# Patient Record
Sex: FEMALE | Race: WHITE | NOT HISPANIC OR LATINO | Employment: FULL TIME | URBAN - NONMETROPOLITAN AREA
[De-identification: names, ages, dates, MRNs, and addresses within clinical notes are randomized per-mention and may not be internally consistent; named-entity substitution may affect disease eponyms.]

---

## 2017-04-20 ENCOUNTER — OFFICE VISIT (OUTPATIENT)
Dept: OBSTETRICS AND GYNECOLOGY | Facility: CLINIC | Age: 27
End: 2017-04-20

## 2017-04-20 VITALS
BODY MASS INDEX: 48.82 KG/M2 | WEIGHT: 293 LBS | DIASTOLIC BLOOD PRESSURE: 90 MMHG | SYSTOLIC BLOOD PRESSURE: 130 MMHG | HEIGHT: 65 IN

## 2017-04-20 DIAGNOSIS — N92.1 MENORRHAGIA WITH IRREGULAR CYCLE: Primary | ICD-10-CM

## 2017-04-20 DIAGNOSIS — R10.2 PELVIC PAIN: ICD-10-CM

## 2017-04-20 PROCEDURE — 99202 OFFICE O/P NEW SF 15 MIN: CPT | Performed by: NURSE PRACTITIONER

## 2017-04-20 RX ORDER — MEDROXYPROGESTERONE ACETATE 10 MG/1
10 TABLET ORAL DAILY
Qty: 30 TABLET | Refills: 1 | Status: SHIPPED | OUTPATIENT
Start: 2017-04-20 | End: 2019-10-03

## 2017-04-20 RX ORDER — IRON POLYSACCHARIDE COMPLEX 150 MG
150 CAPSULE ORAL 2 TIMES DAILY
COMMUNITY

## 2017-04-20 RX ORDER — LEVOTHYROXINE SODIUM 0.1 MG/1
0.23 TABLET ORAL DAILY
COMMUNITY
End: 2018-09-28

## 2017-04-20 RX ORDER — ESCITALOPRAM OXALATE 10 MG/1
10 TABLET ORAL DAILY
COMMUNITY

## 2017-04-20 NOTE — PROGRESS NOTES
"Subjective   History of Present Illness    Shawnee Benjamin is a 26 y.o. female who presents for C/O having a period for 5 weeks. Received first depo provera injection in 2017. Flow rate fluctuates from saturating one tampon and one pad in less than 15 minutes to 3-4 pads a day. C/O of right pelvic pain x 5 weeks that is rated with pain of a 4 out of 10 constant and reaching a 7 out of 10 at night. Heating pads and ibuprofen improve pain symptoms. Had pelvic U/S performed this month at outside facility, WNL. Thyroid levels also WNL. CBC shows iron deficiency anemia, currently taking niferex for that. H/o menorrhagia even with use of COCs. PMH PCOS.       Current contraception: Depo-Provera injections  Sexually active?: Yes  Postmenopausal?: No  HRT?: no  Hysterectomy?: no    Date last pap:  per patient  History of abnormal Pap smear: no    /90  Ht 65\" (165.1 cm)  Wt (!) 346 lb (157 kg)  LMP Comment: Patient states she has been on her period for 5 weeks  BMI 57.58 kg/m2    No past medical history on file.    No past surgical history on file.    The following portions of the patient's history were reviewed and updated as appropriate: allergies, current medications, past family history, past medical history, past social history, past surgical history and problem list.    Review of Systems    Constitutional:  No fatigue, no weight loss, no weight gain, no fever, no chills   Respiratory: No dyspnea, no cough, no hemoptysis, no wheezing, no pleuritic pain   Cardiovascular: No chest pain, no palpitations, no arrhythmia, no orthopnea, no nocturnal dyspnea, no edema, no claudication   Breasts: No discharge from nipple, No breast tenderness and No breast mass   Gastrointestinal: No loss of appetite, No dysphagia, No abdominal pain, No nausea, No vomiting, No change in bowel habits, No diarrhea, No constipation and No blood in stool   Genitourinary: No increased frequency of urination, No " dysuria, No hematuria, No nocturia, No urinary incontinence, No vaginal discharge, No abnormal vaginal bleeding, No menopausal problem, Pelvic pain and Menstrual problem   Skin: No skin rash, No skin lesion, No dry skin, No pruritus and No nail problem   Neurologic: No headache, No dizziness, No lightheadedness, No syncope, No vertigo, No weakness, No numbness, No tremor and No paresthesia   Psychiatric: No difficulty sleeping, No mood swings, No feeling anxious, No confusion and No memory loss          Objective   Physical Exam    General:  Alert and oriented x 3, Cooperative, Well developed & well nourished and No acute distress       Assessment/Plan   Shawnee was seen today for polycystic ovary syndrome.    Diagnoses and all orders for this visit:    Menorrhagia with irregular cycle    Pelvic pain    Other orders  -     medroxyPROGESTERone (PROVERA) 10 MG tablet; Take 1 tablet by mouth Daily.      All questions answered.  Discussed contraception methods, including risks/side effects/benefits.  Discussed pelvic pain. Went over GYN causes such as PID, ovarian torsion, ruptured ovarian cysts, ectopic pregnancy, endometriosis, adenomyosis, and uterine fibroids. Advised patient to use NSAIDs and heating pads for symptomatic treatment.  Discussed menorrhagia/menometrorrhagia. Discussed structural causes such as endometrial/cervical polyps, adenomyosis, leiomyoma, malignancy/hyperplasia. Additionally nonstructural causes such as coagulopathy, ovulatory dysfuction, iatrogenic. Discussed work up with CBC/TSH, pelvic U/S, EMB if necessary. Discussed management with contraception vs. D&C vs. endometrial ablation vs. hysterectomy.    Follow up as needed.   Advised pt to continue using depo provera, will send rx for 30 day course of provera for AUB. Patient to report back if heavy bleeding continues or worsens. She has no further issues at this time.

## 2017-05-01 ENCOUNTER — TELEPHONE (OUTPATIENT)
Dept: OBSTETRICS AND GYNECOLOGY | Facility: CLINIC | Age: 27
End: 2017-05-01

## 2017-11-22 ENCOUNTER — TELEPHONE (OUTPATIENT)
Dept: OBSTETRICS AND GYNECOLOGY | Facility: CLINIC | Age: 27
End: 2017-11-22

## 2017-11-22 NOTE — TELEPHONE ENCOUNTER
----- Message from Jami Mendoza LPN sent at 11/21/2017  2:26 PM CST -----  Contact: [phone]      ----- Message -----     From: Nilsa López     Sent: 11/21/2017   2:14 PM       To: Jami Mendoza LPN    Pt of Mitna's, her mother Jojo(who is on release) called has some questions about bleeding, she saw Mitna for and the medicine she was placed on. Please call back. Thanks!

## 2017-11-22 NOTE — TELEPHONE ENCOUNTER
Started another heavy period with clots a few days ago and is concerned about it. Just had her second Depo injection on 11/1 with family practice (per mother). Advised to present to ER for evaluation of heavy bleeding only if saturating a pad/tampon every hour more than once or passing clots larger than palm sized. Explained that irregular bleeding patterns are expected with Depo and may not improve during the first 3-4 injections (9-12 months). Pt's mother confirmed an understanding and will have pt call back if she has any other concerns.

## 2018-06-14 ENCOUNTER — TRANSCRIBE ORDERS (OUTPATIENT)
Dept: ADMINISTRATIVE | Facility: HOSPITAL | Age: 28
End: 2018-06-14

## 2018-06-14 DIAGNOSIS — E03.9 HYPOTHYROIDISM, UNSPECIFIED TYPE: Primary | ICD-10-CM

## 2018-06-18 ENCOUNTER — HOSPITAL ENCOUNTER (OUTPATIENT)
Dept: MRI IMAGING | Facility: HOSPITAL | Age: 28
Discharge: HOME | End: 2018-06-18

## 2018-06-18 DIAGNOSIS — E03.9 HYPOTHYROIDISM, UNSPECIFIED TYPE: ICD-10-CM

## 2018-06-25 ENCOUNTER — HOSPITAL ENCOUNTER (OUTPATIENT)
Dept: MRI IMAGING | Facility: HOSPITAL | Age: 28
Discharge: HOME | End: 2018-06-25
Admitting: NURSE PRACTITIONER

## 2018-06-25 DIAGNOSIS — E03.9 HYPOTHYROIDISM, UNSPECIFIED TYPE: ICD-10-CM

## 2018-06-25 LAB — CREAT BLDA-MCNC: 0.9 MG/DL (ref 0.6–1.3)

## 2018-06-25 PROCEDURE — 82565 ASSAY OF CREATININE: CPT

## 2018-06-25 PROCEDURE — 70553 MRI BRAIN STEM W/O & W/DYE: CPT

## 2018-06-25 PROCEDURE — 0 GADOBENATE DIMEGLUMINE 529 MG/ML SOLUTION: Performed by: NURSE PRACTITIONER

## 2018-06-25 PROCEDURE — A9577 INJ MULTIHANCE: HCPCS | Performed by: NURSE PRACTITIONER

## 2018-06-25 RX ADMIN — GADOBENATE DIMEGLUMINE 20 ML: 529 INJECTION, SOLUTION INTRAVENOUS at 14:45

## 2018-09-28 ENCOUNTER — OFFICE VISIT (OUTPATIENT)
Dept: ENDOCRINOLOGY | Facility: CLINIC | Age: 28
End: 2018-09-28

## 2018-09-28 ENCOUNTER — APPOINTMENT (OUTPATIENT)
Dept: LAB | Facility: HOSPITAL | Age: 28
End: 2018-09-28

## 2018-09-28 VITALS
OXYGEN SATURATION: 95 % | HEART RATE: 83 BPM | HEIGHT: 65 IN | DIASTOLIC BLOOD PRESSURE: 74 MMHG | SYSTOLIC BLOOD PRESSURE: 126 MMHG | BODY MASS INDEX: 48.82 KG/M2 | WEIGHT: 293 LBS

## 2018-09-28 DIAGNOSIS — L83 ACANTHOSIS NIGRICANS: ICD-10-CM

## 2018-09-28 DIAGNOSIS — E03.8 HYPOTHYROIDISM DUE TO HASHIMOTO'S THYROIDITIS: Primary | ICD-10-CM

## 2018-09-28 DIAGNOSIS — R73.03 PREDIABETES: ICD-10-CM

## 2018-09-28 DIAGNOSIS — E06.3 HYPOTHYROIDISM DUE TO HASHIMOTO'S THYROIDITIS: Primary | ICD-10-CM

## 2018-09-28 DIAGNOSIS — L68.0 HIRSUTISM: ICD-10-CM

## 2018-09-28 LAB
ALBUMIN SERPL-MCNC: 4.2 G/DL (ref 3.4–4.8)
ALBUMIN/GLOB SERPL: 0.9 G/DL (ref 1.1–1.8)
ALP SERPL-CCNC: 92 U/L (ref 38–126)
ALT SERPL W P-5'-P-CCNC: 41 U/L (ref 9–52)
ANION GAP SERPL CALCULATED.3IONS-SCNC: 12 MMOL/L (ref 5–15)
AST SERPL-CCNC: 79 U/L (ref 14–36)
BASOPHILS # BLD AUTO: 0.03 10*3/MM3 (ref 0–0.2)
BASOPHILS NFR BLD AUTO: 0.3 % (ref 0–2)
BILIRUB SERPL-MCNC: 0.6 MG/DL (ref 0.2–1.3)
BUN BLD-MCNC: 12 MG/DL (ref 7–21)
BUN/CREAT SERPL: 14.3 (ref 7–25)
CALCIUM SPEC-SCNC: 8.3 MG/DL (ref 8.4–10.2)
CHLORIDE SERPL-SCNC: 99 MMOL/L (ref 95–110)
CO2 SERPL-SCNC: 26 MMOL/L (ref 22–31)
CORTIS PM SERPL-MCNC: 6.36 MCG/DL (ref 1.7–14.1)
CREAT BLD-MCNC: 0.84 MG/DL (ref 0.5–1)
DEPRECATED RDW RBC AUTO: 56.8 FL (ref 36.4–46.3)
EOSINOPHIL # BLD AUTO: 0.25 10*3/MM3 (ref 0–0.7)
EOSINOPHIL NFR BLD AUTO: 2.3 % (ref 0–7)
ERYTHROCYTE [DISTWIDTH] IN BLOOD BY AUTOMATED COUNT: 19.3 % (ref 11.5–14.5)
FSH SERPL-ACNC: 4 MIU/ML
GFR SERPL CREATININE-BSD FRML MDRD: 80 ML/MIN/1.73 (ref 71–165)
GLOBULIN UR ELPH-MCNC: 4.5 GM/DL (ref 2.3–3.5)
GLUCOSE BLD-MCNC: 76 MG/DL (ref 60–100)
HBA1C MFR BLD: 5.8 % (ref 4–5.6)
HCT VFR BLD AUTO: 35 % (ref 35–45)
HGB BLD-MCNC: 11.3 G/DL (ref 12–15.5)
HYPOCHROMIA BLD QL: NORMAL
IMM GRANULOCYTES # BLD: 0.07 10*3/MM3 (ref 0–0.02)
IMM GRANULOCYTES NFR BLD: 0.6 % (ref 0–0.5)
LH SERPL-ACNC: 4.16 MIU/ML
LYMPHOCYTES # BLD AUTO: 2.64 10*3/MM3 (ref 0.6–4.2)
LYMPHOCYTES NFR BLD AUTO: 24.2 % (ref 10–50)
MCH RBC QN AUTO: 26.2 PG (ref 26.5–34)
MCHC RBC AUTO-ENTMCNC: 32.3 G/DL (ref 31.4–36)
MCV RBC AUTO: 81.2 FL (ref 80–98)
MONOCYTES # BLD AUTO: 0.52 10*3/MM3 (ref 0–0.9)
MONOCYTES NFR BLD AUTO: 4.8 % (ref 0–12)
NEUTROPHILS # BLD AUTO: 7.39 10*3/MM3 (ref 2–8.6)
NEUTROPHILS NFR BLD AUTO: 67.8 % (ref 37–80)
PLAT MORPH BLD: NORMAL
PLATELET # BLD AUTO: 154 10*3/MM3 (ref 150–450)
PMV BLD AUTO: 10.4 FL (ref 8–12)
POTASSIUM BLD-SCNC: 3.5 MMOL/L (ref 3.5–5.1)
PROT SERPL-MCNC: 8.7 G/DL (ref 6.3–8.6)
RBC # BLD AUTO: 4.31 10*6/MM3 (ref 3.77–5.16)
SODIUM BLD-SCNC: 137 MMOL/L (ref 137–145)
T4 FREE SERPL-MCNC: 0.42 NG/DL (ref 0.78–2.19)
TSH SERPL DL<=0.05 MIU/L-ACNC: >100 MIU/ML (ref 0.46–4.68)
WBC MORPH BLD: NORMAL
WBC NRBC COR # BLD: 10.9 10*3/MM3 (ref 3.2–9.8)

## 2018-09-28 PROCEDURE — 83002 ASSAY OF GONADOTROPIN (LH): CPT | Performed by: INTERNAL MEDICINE

## 2018-09-28 PROCEDURE — 83516 IMMUNOASSAY NONANTIBODY: CPT | Performed by: INTERNAL MEDICINE

## 2018-09-28 PROCEDURE — 84681 ASSAY OF C-PEPTIDE: CPT | Performed by: INTERNAL MEDICINE

## 2018-09-28 PROCEDURE — 99204 OFFICE O/P NEW MOD 45 MIN: CPT | Performed by: INTERNAL MEDICINE

## 2018-09-28 PROCEDURE — 82533 TOTAL CORTISOL: CPT | Performed by: INTERNAL MEDICINE

## 2018-09-28 PROCEDURE — 84403 ASSAY OF TOTAL TESTOSTERONE: CPT | Performed by: INTERNAL MEDICINE

## 2018-09-28 PROCEDURE — 84481 FREE ASSAY (FT-3): CPT | Performed by: INTERNAL MEDICINE

## 2018-09-28 PROCEDURE — 82784 ASSAY IGA/IGD/IGG/IGM EACH: CPT | Performed by: INTERNAL MEDICINE

## 2018-09-28 PROCEDURE — 36415 COLL VENOUS BLD VENIPUNCTURE: CPT | Performed by: INTERNAL MEDICINE

## 2018-09-28 PROCEDURE — 82670 ASSAY OF TOTAL ESTRADIOL: CPT | Performed by: INTERNAL MEDICINE

## 2018-09-28 PROCEDURE — 83036 HEMOGLOBIN GLYCOSYLATED A1C: CPT | Performed by: INTERNAL MEDICINE

## 2018-09-28 PROCEDURE — 82627 DEHYDROEPIANDROSTERONE: CPT | Performed by: INTERNAL MEDICINE

## 2018-09-28 PROCEDURE — 83001 ASSAY OF GONADOTROPIN (FSH): CPT | Performed by: INTERNAL MEDICINE

## 2018-09-28 PROCEDURE — 80050 GENERAL HEALTH PANEL: CPT | Performed by: INTERNAL MEDICINE

## 2018-09-28 PROCEDURE — 82024 ASSAY OF ACTH: CPT | Performed by: INTERNAL MEDICINE

## 2018-09-28 PROCEDURE — 84439 ASSAY OF FREE THYROXINE: CPT | Performed by: INTERNAL MEDICINE

## 2018-09-28 PROCEDURE — 83498 ASY HYDROXYPROGESTERONE 17-D: CPT | Performed by: INTERNAL MEDICINE

## 2018-09-28 PROCEDURE — 85007 BL SMEAR W/DIFF WBC COUNT: CPT | Performed by: INTERNAL MEDICINE

## 2018-09-28 RX ORDER — LIOTHYRONINE SODIUM 50 UG/1
50 TABLET ORAL DAILY
Qty: 30 TABLET | Refills: 11
Start: 2018-09-28 | End: 2018-10-01

## 2018-09-28 RX ORDER — LIOTHYRONINE SODIUM 25 UG/1
25 TABLET ORAL DAILY
Qty: 30 TABLET | Refills: 11
Start: 2018-09-28 | End: 2018-10-01

## 2018-09-29 LAB
C PEPTIDE SERPL-MCNC: 5.6 NG/ML (ref 1.1–4.4)
DHEA-S SERPL-MCNC: 17.2 UG/DL (ref 84.8–378)
ESTRADIOL SERPL HS-MCNC: 39.8 PG/ML
GLIADIN PEPTIDE IGA SER-ACNC: 7 UNITS (ref 0–19)
IGA SERPL-MCNC: 486 MG/DL (ref 87–352)
T3FREE SERPL-MCNC: 1.5 PG/ML (ref 2–4.4)
TTG IGA SER-ACNC: <2 U/ML (ref 0–3)

## 2018-09-30 PROBLEM — L83 ACANTHOSIS NIGRICANS: Status: ACTIVE | Noted: 2018-09-30

## 2018-09-30 PROBLEM — L68.0 HIRSUTISM: Status: ACTIVE | Noted: 2018-09-30

## 2018-10-01 LAB — ACTH PLAS-MCNC: 9 PG/ML (ref 7.2–63.3)

## 2018-10-01 RX ORDER — LEVOTHYROXINE SODIUM 300 UG/1
TABLET ORAL
Qty: 45 TABLET | Refills: 11 | Status: SHIPPED | OUTPATIENT
Start: 2018-10-01 | End: 2018-11-09 | Stop reason: SDUPTHER

## 2018-10-02 LAB
17OHP SERPL-MCNC: 29 NG/DL
TESTOST SERPL-MCNC: 36.3 NG/DL (ref 10–55)

## 2018-11-09 ENCOUNTER — OFFICE VISIT (OUTPATIENT)
Dept: ENDOCRINOLOGY | Facility: CLINIC | Age: 28
End: 2018-11-09

## 2018-11-09 VITALS
WEIGHT: 293 LBS | HEART RATE: 90 BPM | BODY MASS INDEX: 48.82 KG/M2 | HEIGHT: 65 IN | DIASTOLIC BLOOD PRESSURE: 64 MMHG | SYSTOLIC BLOOD PRESSURE: 138 MMHG

## 2018-11-09 DIAGNOSIS — L83 ACANTHOSIS NIGRICANS: ICD-10-CM

## 2018-11-09 DIAGNOSIS — L68.0 HIRSUTISM: ICD-10-CM

## 2018-11-09 DIAGNOSIS — E55.9 VITAMIN D DEFICIENCY: ICD-10-CM

## 2018-11-09 DIAGNOSIS — E06.3 HYPOTHYROIDISM DUE TO HASHIMOTO'S THYROIDITIS: Primary | ICD-10-CM

## 2018-11-09 DIAGNOSIS — E03.8 HYPOTHYROIDISM DUE TO HASHIMOTO'S THYROIDITIS: Primary | ICD-10-CM

## 2018-11-09 PROCEDURE — 99214 OFFICE O/P EST MOD 30 MIN: CPT | Performed by: NURSE PRACTITIONER

## 2018-11-09 RX ORDER — LEVOTHYROXINE SODIUM 175 MCG
175 TABLET ORAL DAILY
Qty: 30 TABLET | Refills: 5 | Status: SHIPPED | OUTPATIENT
Start: 2018-11-09 | End: 2019-03-28 | Stop reason: SDUPTHER

## 2018-11-09 RX ORDER — LEVOTHYROXINE SODIUM 300 UG/1
TABLET ORAL
Qty: 30 TABLET | Refills: 5 | Status: SHIPPED | OUTPATIENT
Start: 2018-11-09 | End: 2018-12-19 | Stop reason: DRUGHIGH

## 2018-11-09 NOTE — PROGRESS NOTES
Subjective    Shawnee Benjamin is a 28 y.o. female. she is here today for follow-up.    History of Present Illness         Referring provider  Primary Care Provider     Angie Spivey APRN      Hypothyroidism      Duration - 10 years     Timing - Hypothyroidism is Constant     Quality - Uncontrolled     Severity -   Moderate     Complications -  None     Current symptoms/problems - Fatigue and weight gain      Alleviating Factors - adequate dosing by Ms. Spivey     Aggravating Factors - suspected noncompliance although patient denies. Patient states that she takes her thyroid replacement without missing doses.                 Evaluation history:  TSH   Date Value Ref Range Status   09/28/2018 >100.000 (H) 0.460 - 4.680 mIU/mL Final     Free T4   Date Value Ref Range Status   09/28/2018 0.42 (L) 0.78 - 2.19 ng/dL Final     T3, Free   Date Value Ref Range Status   09/28/2018 1.5 (L) 2.0 - 4.4 pg/mL Final       Current medications:  Current Outpatient Prescriptions   Medication Sig Dispense Refill   • escitalopram (LEXAPRO) 10 MG tablet Take 10 mg by mouth Daily.     • iron polysaccharides (NIFEREX) 150 MG capsule Take 150 mg by mouth 2 (Two) Times a Day.     • levothyroxine (SYNTHROID, LEVOTHROID) 300 MCG tablet Take 1.5 tabs daily, Brand name synthroid 45 tablet 11   • medroxyPROGESTERone (PROVERA) 10 MG tablet Take 1 tablet by mouth Daily. 30 tablet 1   • metFORMIN (GLUCOPHAGE) 500 MG tablet Take 500 mg by mouth 2 (Two) Times a Day With Meals.       No current facility-administered medications for this visit.        The following portions of the patient's history were reviewed and updated as appropriate:   Past Medical History:   Diagnosis Date   • Acanthosis nigricans 9/30/2018   • Hirsutism 9/30/2018   • Hypothyroidism due to Hashimoto's thyroiditis 9/28/2018     History reviewed. No pertinent surgical history.  Family History   Problem Relation Age of Onset   • Diabetes Mother      OB History   "   No data available        Allergies   Allergen Reactions   • Erythromycin Rash   • Septra [Sulfamethoxazole-Trimethoprim] Rash     Social History     Social History   • Marital status: Single     Social History Main Topics   • Smoking status: Never Smoker   • Smokeless tobacco: Never Used   • Alcohol use No   • Drug use: No   • Sexual activity: Defer     Other Topics Concern   • Not on file       Review of Systems  Review of Systems   Constitutional: Negative for activity change, appetite change, diaphoresis and fatigue.   HENT: Negative for facial swelling, sneezing, sore throat, tinnitus, trouble swallowing and voice change.    Eyes: Negative for photophobia, pain, discharge, redness, itching and visual disturbance.   Respiratory: Negative for apnea, cough, choking, chest tightness and shortness of breath.    Cardiovascular: Negative for chest pain, palpitations and leg swelling.   Gastrointestinal: Negative for abdominal distention, abdominal pain, constipation, diarrhea, nausea and vomiting.   Endocrine: Negative for cold intolerance, heat intolerance, polydipsia, polyphagia and polyuria.   Genitourinary: Negative for difficulty urinating, dysuria, frequency, hematuria and urgency.   Musculoskeletal: Negative for arthralgias, back pain, gait problem, joint swelling, myalgias, neck pain and neck stiffness.   Skin: Negative for color change, pallor, rash and wound.   Neurological: Negative for dizziness, tremors, weakness, light-headedness, numbness and headaches.   Hematological: Negative for adenopathy. Does not bruise/bleed easily.   Psychiatric/Behavioral: Negative for behavioral problems, confusion and sleep disturbance.        Objective    /64 (BP Location: Left arm, Patient Position: Sitting, Cuff Size: Adult)   Pulse 90   Ht 165.1 cm (65\")   Wt (!) 153 kg (338 lb)   BMI 56.25 kg/m²   Physical Exam   Constitutional: She is oriented to person, place, and time. She appears well-developed and " well-nourished. No distress.   HENT:   Head: Normocephalic and atraumatic.   Right Ear: External ear normal.   Left Ear: External ear normal.   Nose: Nose normal.   Eyes: Pupils are equal, round, and reactive to light. Conjunctivae and EOM are normal.   Neck: Normal range of motion. Neck supple. No tracheal deviation present. No thyromegaly present.   Cardiovascular: Normal rate, regular rhythm and normal heart sounds.    No murmur heard.  Pulmonary/Chest: Effort normal and breath sounds normal. No respiratory distress. She has no wheezes.   Abdominal: Soft. Bowel sounds are normal. There is no tenderness. There is no rebound and no guarding.   Musculoskeletal: Normal range of motion. She exhibits no edema, tenderness or deformity.   Neurological: She is alert and oriented to person, place, and time. No cranial nerve deficit.   Skin: Skin is warm and dry. No rash noted.   Psychiatric: She has a normal mood and affect. Her behavior is normal. Judgment and thought content normal.       Lab Review  Lab Results   Component Value Date    TSH >100.000 (H) 09/28/2018     Lab Results   Component Value Date    FREET4 0.42 (L) 09/28/2018        Assessment/Plan      1. Hypothyroidism due to Hashimoto's thyroiditis    2. Vitamin D deficiency    3. Hirsutism    4. Acanthosis nigricans    . This diagnosis was discussed and reviewed with the patient including the advantages of drug therapy.     1. Orders placed during this encounter include:  Orders Placed This Encounter   Procedures   • TSH     Standing Status:   Future     Standing Expiration Date:   11/9/2019   • T4, Free     Standing Status:   Future     Standing Expiration Date:   11/9/2019   • Comprehensive Metabolic Panel     Standing Status:   Future     Standing Expiration Date:   11/9/2019   • Vitamin D 25 Hydroxy     Standing Status:   Future     Standing Expiration Date:   11/9/2019   • Vitamin B12     Standing Status:   Future     Standing Expiration Date:   11/9/2019    • CBC & Differential     Standing Status:   Future     Standing Expiration Date:   11/9/2019     Order Specific Question:   Manual Differential     Answer:   No       Medications prescribed:  Outpatient Encounter Prescriptions as of 11/9/2018   Medication Sig Dispense Refill   • escitalopram (LEXAPRO) 10 MG tablet Take 10 mg by mouth Daily.     • iron polysaccharides (NIFEREX) 150 MG capsule Take 150 mg by mouth 2 (Two) Times a Day.     • levothyroxine (SYNTHROID, LEVOTHROID) 300 MCG tablet Take 1.5 tabs daily, Brand name synthroid 45 tablet 11   • medroxyPROGESTERone (PROVERA) 10 MG tablet Take 1 tablet by mouth Daily. 30 tablet 1   • metFORMIN (GLUCOPHAGE) 500 MG tablet Take 500 mg by mouth 2 (Two) Times a Day With Meals.       No facility-administered encounter medications on file as of 11/9/2018.      Patient has hypothyroidism     With levothyroxine 300 mcgs daily her TSH was 100     With cytomel 75 mg daily her TSH Is greater than 100     I believe noncompliance      Nevertheless rule out celiac     Rule out consumptive hypothyroidism     Plan     Stop cytomel  Restart brand name synthroid , I will start with 450 mcgs daily      Monthly labs.       November 2018    TSH - 6.89        Increase to 475 mcg one daily   ---     She has acanthosis, prediabetes on metformin      Provera?  I need to verify . She states only intermittent use.      I need to rule out Cushing's      CAH ruled out     Reevaluate menstrual cycle . It should be at least every 3 months.      Component      Latest Ref Rng & Units 9/28/2018   ACTH      7.2 - 63.3 pg/mL 9.0   Cortisol - PM      1.70 - 14.10 mcg/dL 6.36   Testosterone, Total      10.0 - 55.0 ng/dL 36.3   Estradiol      pg/mL 39.8   17-Hydroxyprogesterone      ng/dL 29   DHEA-Sulfate      84.8 - 378.0 ug/dL 17.2 (L)   Hemoglobin A1C      4 - 5.6 % 5.8 (H)   C-Peptide      1.1 - 4.4 ng/mL 5.6 (H)         Vitamin d def    Taking vitamin d daily       4. Return in about 6 weeks  (around 12/21/2018) for Recheck.

## 2018-11-15 DIAGNOSIS — E03.8 HYPOTHYROIDISM DUE TO HASHIMOTO'S THYROIDITIS: ICD-10-CM

## 2018-11-15 DIAGNOSIS — E06.3 HYPOTHYROIDISM DUE TO HASHIMOTO'S THYROIDITIS: ICD-10-CM

## 2018-12-19 ENCOUNTER — OFFICE VISIT (OUTPATIENT)
Dept: ENDOCRINOLOGY | Facility: CLINIC | Age: 28
End: 2018-12-19

## 2018-12-19 VITALS
HEIGHT: 65 IN | DIASTOLIC BLOOD PRESSURE: 74 MMHG | SYSTOLIC BLOOD PRESSURE: 126 MMHG | WEIGHT: 293 LBS | HEART RATE: 102 BPM | BODY MASS INDEX: 48.82 KG/M2

## 2018-12-19 DIAGNOSIS — E55.9 VITAMIN D DEFICIENCY: ICD-10-CM

## 2018-12-19 DIAGNOSIS — E06.3 HYPOTHYROIDISM DUE TO HASHIMOTO'S THYROIDITIS: Primary | ICD-10-CM

## 2018-12-19 DIAGNOSIS — L68.0 HIRSUTISM: ICD-10-CM

## 2018-12-19 DIAGNOSIS — E03.8 HYPOTHYROIDISM DUE TO HASHIMOTO'S THYROIDITIS: Primary | ICD-10-CM

## 2018-12-19 PROCEDURE — 99214 OFFICE O/P EST MOD 30 MIN: CPT | Performed by: NURSE PRACTITIONER

## 2018-12-19 RX ORDER — LEVOTHYROXINE SODIUM 300 UG/1
300 TABLET ORAL DAILY
COMMUNITY
End: 2019-03-28 | Stop reason: SDUPTHER

## 2018-12-19 NOTE — PROGRESS NOTES
Subjective    Shawnee Benjamin is a 28 y.o. female. she is here today for follow-up.    History of Present Illness       Referring provider  Primary Care Provider     Angie Spivey APRN      Hypothyroidism      Duration - 10 years     Timing - Hypothyroidism is Constant     Quality - Uncontrolled     Severity -   Moderate     Complications -  None     Current symptoms/problems - Fatigue and weight gain      Alleviating Factors - adequate dosing by Ms. Spivey     Aggravating Factors - suspected noncompliance although patient denies. Patient states that she takes her thyroid replacement without missing doses.     Quality     November 2018       TSH - 6. 89        Evaluation history:  TSH   Date Value Ref Range Status   09/28/2018 >100.000 (H) 0.460 - 4.680 mIU/mL Final     Free T4   Date Value Ref Range Status   09/28/2018 0.42 (L) 0.78 - 2.19 ng/dL Final     T3, Free   Date Value Ref Range Status   09/28/2018 1.5 (L) 2.0 - 4.4 pg/mL Final       Current medications:  Current Outpatient Medications   Medication Sig Dispense Refill   • escitalopram (LEXAPRO) 10 MG tablet Take 10 mg by mouth Daily.     • iron polysaccharides (NIFEREX) 150 MG capsule Take 150 mg by mouth 2 (Two) Times a Day.     • levothyroxine (SYNTHROID) 300 MCG tablet Take 300 mcg by mouth Daily.     • medroxyPROGESTERone (PROVERA) 10 MG tablet Take 1 tablet by mouth Daily. 30 tablet 1   • metFORMIN (GLUCOPHAGE) 500 MG tablet Take 500 mg by mouth 2 (Two) Times a Day With Meals.     • SYNTHROID 175 MCG tablet Take 1 tablet by mouth Daily. 30 tablet 5     No current facility-administered medications for this visit.        The following portions of the patient's history were reviewed and updated as appropriate:   Past Medical History:   Diagnosis Date   • Acanthosis nigricans 9/30/2018   • Hirsutism 9/30/2018   • Hypothyroidism due to Hashimoto's thyroiditis 9/28/2018     No past surgical history on file.  Family History   Problem  "Relation Age of Onset   • Diabetes Mother      OB History     No data available        Allergies   Allergen Reactions   • Erythromycin Rash   • Septra [Sulfamethoxazole-Trimethoprim] Rash     Social History     Socioeconomic History   • Marital status: Single     Spouse name: Not on file   • Number of children: Not on file   • Years of education: Not on file   • Highest education level: Not on file   Tobacco Use   • Smoking status: Never Smoker   • Smokeless tobacco: Never Used   Substance and Sexual Activity   • Alcohol use: No   • Drug use: No   • Sexual activity: Defer       Review of Systems  Review of Systems   Constitutional: Negative for activity change, appetite change, diaphoresis and fatigue.   HENT: Negative for facial swelling, sneezing, sore throat, tinnitus, trouble swallowing and voice change.    Eyes: Negative for photophobia, pain, discharge, redness, itching and visual disturbance.   Respiratory: Negative for apnea, cough, choking, chest tightness and shortness of breath.    Cardiovascular: Negative for chest pain, palpitations and leg swelling.   Gastrointestinal: Negative for abdominal distention, abdominal pain, constipation, diarrhea, nausea and vomiting.   Endocrine: Negative for cold intolerance, heat intolerance, polydipsia, polyphagia and polyuria.   Genitourinary: Negative for difficulty urinating, dysuria, frequency, hematuria and urgency.   Musculoskeletal: Negative for arthralgias, back pain, gait problem, joint swelling, myalgias, neck pain and neck stiffness.   Skin: Negative for color change, pallor, rash and wound.   Neurological: Negative for dizziness, tremors, weakness, light-headedness, numbness and headaches.   Hematological: Negative for adenopathy. Does not bruise/bleed easily.   Psychiatric/Behavioral: Negative for behavioral problems, confusion and sleep disturbance.        Objective    /74   Pulse 102   Ht 165.1 cm (65\")   Wt (!) 150 kg (331 lb 1 oz)   LMP " 11/07/2018 (Within Days)   Breastfeeding? No   BMI 55.09 kg/m²   Physical Exam   Constitutional: She is oriented to person, place, and time. She appears well-developed and well-nourished. No distress.   HENT:   Head: Normocephalic and atraumatic.   Right Ear: External ear normal.   Left Ear: External ear normal.   Nose: Nose normal.   Eyes: Conjunctivae and EOM are normal. Pupils are equal, round, and reactive to light.   Neck: Normal range of motion. Neck supple. No tracheal deviation present. No thyromegaly present.   Cardiovascular: Normal rate, regular rhythm and normal heart sounds.   No murmur heard.  Pulmonary/Chest: Effort normal and breath sounds normal. No respiratory distress. She has no wheezes.   Abdominal: Soft. Bowel sounds are normal. There is no tenderness. There is no rebound and no guarding.   Musculoskeletal: Normal range of motion. She exhibits no edema, tenderness or deformity.   Neurological: She is alert and oriented to person, place, and time. No cranial nerve deficit.   Skin: Skin is warm and dry. No rash noted.   Psychiatric: She has a normal mood and affect. Her behavior is normal. Judgment and thought content normal.       Lab Review  Lab Results   Component Value Date    TSH >100.000 (H) 09/28/2018     Lab Results   Component Value Date    FREET4 0.42 (L) 09/28/2018        Assessment/Plan      1. Hypothyroidism due to Hashimoto's thyroiditis    2. Vitamin D deficiency    3. Hirsutism    . This diagnosis was discussed and reviewed with the patient including the advantages of drug therapy.     1. Orders placed during this encounter include:  Orders Placed This Encounter   Procedures   • TSH   • Comprehensive Metabolic Panel   • Comprehensive Metabolic Panel     Standing Status:   Future     Standing Expiration Date:   12/19/2019   • Vitamin D 25 Hydroxy     Standing Status:   Future     Standing Expiration Date:   12/19/2019   • TSH     Standing Status:   Future     Standing Expiration  Date:   12/19/2019   • CBC & Differential     Order Specific Question:   Manual Differential     Answer:   No   • CBC & Differential     Standing Status:   Future     Standing Expiration Date:   12/19/2019     Order Specific Question:   Manual Differential     Answer:   No       Medications prescribed:  Outpatient Encounter Medications as of 12/19/2018   Medication Sig Dispense Refill   • escitalopram (LEXAPRO) 10 MG tablet Take 10 mg by mouth Daily.     • iron polysaccharides (NIFEREX) 150 MG capsule Take 150 mg by mouth 2 (Two) Times a Day.     • levothyroxine (SYNTHROID) 300 MCG tablet Take 300 mcg by mouth Daily.     • medroxyPROGESTERone (PROVERA) 10 MG tablet Take 1 tablet by mouth Daily. 30 tablet 1   • metFORMIN (GLUCOPHAGE) 500 MG tablet Take 500 mg by mouth 2 (Two) Times a Day With Meals.     • SYNTHROID 175 MCG tablet Take 1 tablet by mouth Daily. 30 tablet 5   • [DISCONTINUED] levothyroxine (SYNTHROID, LEVOTHROID) 300 MCG tablet Take 1 tabs daily-brand only 30 tablet 5     No facility-administered encounter medications on file as of 12/19/2018.      Patient has hypothyroidism     With levothyroxine 300 mcgs daily her TSH was 100     With cytomel 75 mg daily her TSH Is greater than 100     I believe noncompliance      Nevertheless rule out celiac     Rule out consumptive hypothyroidism     Plan     Stop cytomel  Restart brand name synthroid , I will start with 450 mcgs daily      Monthly labs.         November 2018     TSH - 6.89         Increase to 475 mcg one daily   ---     She has acanthosis, prediabetes on metformin      Provera?  I need to verify . She states only intermittent use.      I need to rule out Cushing's      CAH ruled out     Reevaluate menstrual cycle . It should be at least every 3 months.      Component      Latest Ref Rng & Units 9/28/2018   ACTH      7.2 - 63.3 pg/mL 9.0   Cortisol - PM      1.70 - 14.10 mcg/dL 6.36   Testosterone, Total      10.0 - 55.0 ng/dL 36.3   Estradiol       pg/mL 39.8   17-Hydroxyprogesterone      ng/dL 29   DHEA-Sulfate      84.8 - 378.0 ug/dL 17.2 (L)   Hemoglobin A1C      4 - 5.6 % 5.8 (H)   C-Peptide      1.1 - 4.4 ng/mL 5.6 (H)         Vitamin d def     Taking vitamin d daily     Need new labs today             4. Return in about 3 months (around 3/19/2019) for Recheck.

## 2019-03-28 RX ORDER — LEVOTHYROXINE SODIUM 300 MCG
TABLET ORAL
Qty: 30 TABLET | Refills: 0 | Status: SHIPPED | OUTPATIENT
Start: 2019-03-28 | End: 2019-05-25 | Stop reason: SDUPTHER

## 2019-03-28 RX ORDER — LEVOTHYROXINE SODIUM 175 MCG
TABLET ORAL
Qty: 30 TABLET | Refills: 0 | Status: SHIPPED | OUTPATIENT
Start: 2019-03-28 | End: 2019-05-25 | Stop reason: SDUPTHER

## 2019-05-28 RX ORDER — LEVOTHYROXINE SODIUM 300 MCG
TABLET ORAL
Qty: 30 TABLET | Refills: 0 | Status: SHIPPED | OUTPATIENT
Start: 2019-05-28 | End: 2019-06-24 | Stop reason: SDUPTHER

## 2019-05-28 RX ORDER — LEVOTHYROXINE SODIUM 175 MCG
TABLET ORAL
Qty: 30 TABLET | Refills: 0 | Status: SHIPPED | OUTPATIENT
Start: 2019-05-28 | End: 2019-06-24 | Stop reason: SDUPTHER

## 2019-06-22 RX ORDER — LEVOTHYROXINE SODIUM 175 MCG
TABLET ORAL
Qty: 30 TABLET | Refills: 0 | Status: CANCELLED | OUTPATIENT
Start: 2019-06-22

## 2019-06-22 RX ORDER — LEVOTHYROXINE SODIUM 300 MCG
TABLET ORAL
Qty: 30 TABLET | Refills: 0 | Status: CANCELLED | OUTPATIENT
Start: 2019-06-22

## 2019-06-24 RX ORDER — LEVOTHYROXINE SODIUM 300 MCG
300 TABLET ORAL DAILY
Qty: 30 TABLET | Refills: 0 | Status: SHIPPED | OUTPATIENT
Start: 2019-06-24 | End: 2019-07-23 | Stop reason: SDUPTHER

## 2019-06-24 RX ORDER — LEVOTHYROXINE SODIUM 175 MCG
175 TABLET ORAL DAILY
Qty: 30 TABLET | Refills: 0 | Status: SHIPPED | OUTPATIENT
Start: 2019-06-24 | End: 2019-07-23 | Stop reason: SDUPTHER

## 2019-07-23 RX ORDER — LEVOTHYROXINE SODIUM 300 MCG
TABLET ORAL
Qty: 30 TABLET | Refills: 0 | Status: SHIPPED | OUTPATIENT
Start: 2019-07-23

## 2019-07-23 RX ORDER — LEVOTHYROXINE SODIUM 175 MCG
TABLET ORAL
Qty: 30 TABLET | Refills: 0 | Status: SHIPPED | OUTPATIENT
Start: 2019-07-23

## 2019-08-20 RX ORDER — LEVOTHYROXINE SODIUM 300 MCG
TABLET ORAL
Qty: 30 TABLET | Refills: 0 | OUTPATIENT
Start: 2019-08-20

## 2019-08-20 RX ORDER — LEVOTHYROXINE SODIUM 175 MCG
TABLET ORAL
Qty: 30 TABLET | Refills: 0 | OUTPATIENT
Start: 2019-08-20

## 2019-08-29 RX ORDER — LEVOTHYROXINE SODIUM 300 MCG
TABLET ORAL
Qty: 30 TABLET | Refills: 0 | OUTPATIENT
Start: 2019-08-29

## 2019-08-29 RX ORDER — LEVOTHYROXINE SODIUM 175 MCG
TABLET ORAL
Qty: 30 TABLET | Refills: 0 | OUTPATIENT
Start: 2019-08-29

## 2019-09-23 NOTE — PROGRESS NOTES
PHYSICAL EXAM:  CONSTITUTIONAL: Alert, appropriate, no acute distress, obese  EYES: Non icteric, EOM intact, pupils equal round   ENT: Mucus membranes moist, no oral pharyngeal lesions, external inspection of ears and nose are normal  NECK: Supple, no masses. No palpable thyroid mass  CHEST/LUNGS: CTA bilaterally, normal respiratory effort   CARDIOVASCULAR: RRR, no murmurs. No lower extremity edema  ABDOMEN: soft non-tender, active bowel sounds, no HSM. No palpable masses  EXTREMITIES: warm, full ROM in all 4 extremities, no focal weakness. SKIN: warm, dry with no rashes or lesions  LYMPH: No cervical, clavicular, axillary, or inguinal lymphadenopathy  NEUROLOGIC: follows commands, non focal   PSYCH: mood and affect appropriate. Alert and oriented to time, place, person        LABORATORY RESULTS REVIEWED BY ME:  WBC 10.7, hemoglobin 13.0, platelet count 417,371  8/26/2019-ferritin 18.0 iron saturation 13%, TIBC 347, iron 45. Normal LFTs, creatinine 0.79, glucose 197,     RADIOLOGY STUDIES REVIEWED BY ME:  Normal MRI of the pituitary gland June 2018    ASSESSMENT:  #Iron deficient state-CBC today showed hemoglobin 13/MCV 86 and platelet counts 031,344. RDW 18.8. The obvious reason for her iron deficiency is persistent menorrhagia. I am not sure if her uncontrolled hypothyroidism may be playing a role on her menorrhagia. From my standpoint I would like to rule out von Willebrand disease. Essentially she has iron deficient state. Her hemoglobin has slightly increased since her last CBC. She has started iron pills a few weeks ago. She was requested to switch to iron sulfate 325 mg p.o. 3 times daily. The patient was counseled today about diagnosis, diagnostic tests, medications, side effects and disease management. The method of counseling included verbal explanation. The patient verbalized understanding.   #Hypothyroidism-continue Synthroid as per endocrinology  #Menorrhagia- follow-up with GYN    PLAN:  Start iron sulfate 325 mg p.o. 3 times daily  Follow-up with GYN  Follow-up in 6 weeks. We will check von Willebrand levels before next visit. Jud Kenyon, Dr Kimmy Ko, personally performed the services described in this documentation as scribed by Landry Meza MA in my presence and is both accurate and complete. Over 50% of the total visit time of 60 minutes in face to face encounter with the patient, out of which more than 50% of the time was spent in counseling patient or family and coordination of care. Counseling included but was not limited to time spent reviewing labs, imaging studies/ treatment plan and answering questions.      09/23/19  9:20 AM

## 2019-09-24 ENCOUNTER — OFFICE VISIT (OUTPATIENT)
Dept: HEMATOLOGY | Age: 29
End: 2019-09-24
Payer: MEDICAID

## 2019-09-24 VITALS
DIASTOLIC BLOOD PRESSURE: 84 MMHG | SYSTOLIC BLOOD PRESSURE: 152 MMHG | HEART RATE: 90 BPM | WEIGHT: 293 LBS | OXYGEN SATURATION: 97 %

## 2019-09-24 DIAGNOSIS — E55.9 VITAMIN D DEFICIENCY: ICD-10-CM

## 2019-09-24 DIAGNOSIS — E03.9 HYPOTHYROIDISM, UNSPECIFIED TYPE: ICD-10-CM

## 2019-09-24 DIAGNOSIS — I10 HYPERTENSION, UNSPECIFIED TYPE: ICD-10-CM

## 2019-09-24 DIAGNOSIS — D64.9 ANEMIA, UNSPECIFIED TYPE: ICD-10-CM

## 2019-09-24 PROCEDURE — 99205 OFFICE O/P NEW HI 60 MIN: CPT | Performed by: INTERNAL MEDICINE

## 2019-09-24 PROCEDURE — G8421 BMI NOT CALCULATED: HCPCS | Performed by: INTERNAL MEDICINE

## 2019-09-24 PROCEDURE — G8428 CUR MEDS NOT DOCUMENT: HCPCS | Performed by: INTERNAL MEDICINE

## 2019-09-24 PROCEDURE — 1036F TOBACCO NON-USER: CPT | Performed by: INTERNAL MEDICINE

## 2019-09-24 RX ORDER — HYDROCHLOROTHIAZIDE 25 MG/1
TABLET ORAL
COMMUNITY
Start: 2019-08-29

## 2019-09-24 RX ORDER — LEVOTHYROXINE SODIUM 175 MCG
200 TABLET ORAL
COMMUNITY
Start: 2019-09-16

## 2019-09-24 RX ORDER — POTASSIUM CHLORIDE 20 MEQ/1
TABLET, EXTENDED RELEASE ORAL
COMMUNITY
Start: 2019-08-29

## 2019-09-24 RX ORDER — LEVOTHYROXINE SODIUM 300 MCG
TABLET ORAL
COMMUNITY
Start: 2019-09-16

## 2019-09-24 RX ORDER — IRON POLYSACCHARIDE COMPLEX 150 MG
150 CAPSULE ORAL
COMMUNITY

## 2019-09-24 RX ORDER — ERGOCALCIFEROL 1.25 MG/1
CAPSULE ORAL
COMMUNITY
Start: 2019-08-29

## 2019-09-24 SDOH — HEALTH STABILITY: MENTAL HEALTH: HOW OFTEN DO YOU HAVE A DRINK CONTAINING ALCOHOL?: NEVER

## 2019-10-03 ENCOUNTER — OFFICE VISIT (OUTPATIENT)
Dept: OBSTETRICS AND GYNECOLOGY | Facility: CLINIC | Age: 29
End: 2019-10-03

## 2019-10-03 ENCOUNTER — APPOINTMENT (OUTPATIENT)
Dept: LAB | Facility: HOSPITAL | Age: 29
End: 2019-10-03

## 2019-10-03 VITALS
DIASTOLIC BLOOD PRESSURE: 80 MMHG | SYSTOLIC BLOOD PRESSURE: 138 MMHG | WEIGHT: 293 LBS | HEIGHT: 67 IN | BODY MASS INDEX: 45.99 KG/M2

## 2019-10-03 DIAGNOSIS — Z30.09 UNWANTED FERTILITY: ICD-10-CM

## 2019-10-03 DIAGNOSIS — R73.09 ELEVATED HEMOGLOBIN A1C: ICD-10-CM

## 2019-10-03 DIAGNOSIS — Z30.011 ENCOUNTER FOR INITIAL PRESCRIPTION OF CONTRACEPTIVE PILLS: ICD-10-CM

## 2019-10-03 DIAGNOSIS — E03.8 HYPOTHYROIDISM DUE TO HASHIMOTO'S THYROIDITIS: ICD-10-CM

## 2019-10-03 DIAGNOSIS — E06.3 HYPOTHYROIDISM DUE TO HASHIMOTO'S THYROIDITIS: ICD-10-CM

## 2019-10-03 DIAGNOSIS — E28.2 PCOS (POLYCYSTIC OVARIAN SYNDROME): ICD-10-CM

## 2019-10-03 DIAGNOSIS — N92.1 MENORRHAGIA WITH IRREGULAR CYCLE: Primary | ICD-10-CM

## 2019-10-03 LAB
DEPRECATED RDW RBC AUTO: 54.5 FL (ref 37–54)
ERYTHROCYTE [DISTWIDTH] IN BLOOD BY AUTOMATED COUNT: 17.6 % (ref 12.3–15.4)
ESTRADIOL SERPL HS-MCNC: 18.4 PG/ML
FSH SERPL-ACNC: 3.23 MIU/ML
HBA1C MFR BLD: 7.1 % (ref 4.8–5.6)
HCT VFR BLD AUTO: 38.3 % (ref 34–46.6)
HGB BLD-MCNC: 12.6 G/DL (ref 12–15.9)
IRON 24H UR-MRATE: 50 MCG/DL (ref 37–145)
IRON SATN MFR SERPL: 12 % (ref 20–50)
LH SERPL-ACNC: 2.76 MIU/ML
MCH RBC QN AUTO: 28 PG (ref 26.6–33)
MCHC RBC AUTO-ENTMCNC: 32.9 G/DL (ref 31.5–35.7)
MCV RBC AUTO: 85.1 FL (ref 79–97)
PLATELET # BLD AUTO: 243 10*3/MM3 (ref 140–450)
PMV BLD AUTO: 10.4 FL (ref 6–12)
PROGEST SERPL-MCNC: <0.05 NG/ML
RBC # BLD AUTO: 4.5 10*6/MM3 (ref 3.77–5.28)
TIBC SERPL-MCNC: 432 MCG/DL (ref 298–536)
TRANSFERRIN SERPL-MCNC: 290 MG/DL (ref 200–360)
TSH SERPL DL<=0.05 MIU/L-ACNC: 14.7 UIU/ML (ref 0.27–4.2)
WBC NRBC COR # BLD: 9.42 10*3/MM3 (ref 3.4–10.8)

## 2019-10-03 PROCEDURE — 83525 ASSAY OF INSULIN: CPT | Performed by: NURSE PRACTITIONER

## 2019-10-03 PROCEDURE — 83001 ASSAY OF GONADOTROPIN (FSH): CPT | Performed by: NURSE PRACTITIONER

## 2019-10-03 PROCEDURE — 84144 ASSAY OF PROGESTERONE: CPT | Performed by: NURSE PRACTITIONER

## 2019-10-03 PROCEDURE — 36415 COLL VENOUS BLD VENIPUNCTURE: CPT | Performed by: NURSE PRACTITIONER

## 2019-10-03 PROCEDURE — 99214 OFFICE O/P EST MOD 30 MIN: CPT | Performed by: NURSE PRACTITIONER

## 2019-10-03 PROCEDURE — 84466 ASSAY OF TRANSFERRIN: CPT | Performed by: NURSE PRACTITIONER

## 2019-10-03 PROCEDURE — 83036 HEMOGLOBIN GLYCOSYLATED A1C: CPT | Performed by: NURSE PRACTITIONER

## 2019-10-03 PROCEDURE — 83540 ASSAY OF IRON: CPT | Performed by: NURSE PRACTITIONER

## 2019-10-03 PROCEDURE — 80050 GENERAL HEALTH PANEL: CPT | Performed by: NURSE PRACTITIONER

## 2019-10-03 PROCEDURE — 83002 ASSAY OF GONADOTROPIN (LH): CPT | Performed by: NURSE PRACTITIONER

## 2019-10-03 PROCEDURE — 82670 ASSAY OF TOTAL ESTRADIOL: CPT | Performed by: NURSE PRACTITIONER

## 2019-10-03 RX ORDER — ERGOCALCIFEROL 1.25 MG/1
CAPSULE ORAL
COMMUNITY
Start: 2019-09-26

## 2019-10-03 RX ORDER — POTASSIUM CHLORIDE 20 MEQ/1
TABLET, EXTENDED RELEASE ORAL
COMMUNITY
Start: 2019-09-26

## 2019-10-03 RX ORDER — NORETHINDRONE ACETATE AND ETHINYL ESTRADIOL 1MG-20(21)
1 KIT ORAL DAILY
Qty: 28 TABLET | Refills: 12 | Status: SHIPPED | OUTPATIENT
Start: 2019-10-03 | End: 2020-10-02

## 2019-10-03 RX ORDER — NORETHINDRONE ACETATE AND ETHINYL ESTRADIOL 1MG-20(21)
KIT ORAL
Qty: 28 TABLET | Refills: 0 | Status: SHIPPED | OUTPATIENT
Start: 2019-10-03

## 2019-10-03 RX ORDER — HYDROCHLOROTHIAZIDE 25 MG/1
TABLET ORAL
COMMUNITY
Start: 2019-09-26

## 2019-10-03 NOTE — PROGRESS NOTES
Subjective   Chief Complaint   Patient presents with   • Pelvic Pain     newpt     Shawnee Benjamin is a 29 y.o. year old  presenting to be seen with complaints of trouble with her menses.   Current birth control method: Depo-Provera injections.    No LMP recorded.    The last time she recalls having predictable regular cycles was unsure. States that she has been bleeding daily since . She was seen in May after bleeding for an entire month and was started on Depo Provera injections. She was also given Provera oral but she never stopped bleeding. Her TSH was checked in  and it was elevated at 32.86. On  it was rechecked and was 277.74. SANDRA Nowak increased her brand name Synthroid to 475mcg daily. Shawnee reports to me that she takes it on an empty stomach in the morning, 2 hours prior to food or other medications and she does not miss dosages.       · Additional notable symptoms: none  · Changes in weight: weight has increased 13 pounds over last 10 month(s)  · Recent increase in stress? no  · Is there associated pain ? no    Past 6 month menstrual history:    Cycle Frequency: daily   Menstrual cycle character: flow is typically light and flow is typically normal   Cycle Duration: 0 - 0   Number of heavy days of flows: 0   Dysmenorrhea: mild, moderate and is not affecting her activities of daily living   PMS: mild, moderate and is not affecting her activities of daily living   Intermenstrual bleeding present: {yes   Post-coital bleeding present: not asked     She is sexually active.  In the past 12 months there has not been new sexual partners.  Condoms are not typically used.  She would not like to be screened for STD's at today's exam.     No Additional Complaints Reported    The following portions of the patient's history were reviewed and updated as appropriate:problem list, current medications, allergies, past family history, past medical history, past social history and past  "surgical history    Social History    Tobacco Use      Smoking status: Never Smoker      Smokeless tobacco: Never Used    Review of Systems   Constitutional: Positive for fatigue. Negative for activity change, appetite change, diaphoresis, unexpected weight gain and unexpected weight loss.   Respiratory: Negative for chest tightness and shortness of breath.    Cardiovascular: Negative for chest pain and palpitations.   Gastrointestinal: Negative for abdominal distention, abdominal pain, constipation and diarrhea.   Endocrine: Negative.    Genitourinary: Positive for dyspareunia, menstrual problem and vaginal bleeding. Negative for breast discharge, breast lump, breast pain, decreased libido, dysuria, pelvic pain, pelvic pressure, urinary incontinence, vaginal discharge and vaginal pain.   Musculoskeletal: Negative for myalgias.   Skin: Negative for color change, dry skin and skin lesions.   Neurological: Positive for weakness and light-headedness. Negative for dizziness, headache and memory problem.   Psychiatric/Behavioral: Positive for depressed mood. Negative for agitation, dysphoric mood, sleep disturbance and stress. The patient is not nervous/anxious.         Objective   /80   Ht 168.9 cm (66.5\")   Wt (!) 156 kg (344 lb)   Breastfeeding? No   BMI 54.69 kg/m²     Physical Exam   Constitutional: She is oriented to person, place, and time. Vital signs are normal. She appears well-developed and well-nourished. No distress.   Cardiovascular: Normal rate, regular rhythm and normal heart sounds.   Pulmonary/Chest: Effort normal and breath sounds normal.   Genitourinary:   Genitourinary Comments: Pelvic exam deferred due to bleeding.    Neurological: She is alert and oriented to person, place, and time.   Skin: Skin is warm and dry. She is not diaphoretic.   Psychiatric: She has a normal mood and affect. Her behavior is normal.   Nursing note and vitals reviewed.      Lab Review   CBC, CMP and TSH    Imaging "   Pt reports that she had an ultrasound recently but no record is on file.         Diagnoses and all orders for this visit:    Menorrhagia with irregular cycle  -     Insulin, Total  -     Follicle Stimulating Hormone  -     Luteinizing Hormone  -     Estradiol  -     Progesterone  -     TSH  -     Hemoglobin A1c  -     CBC (No Diff)  -     Iron Profile  -     US Non-ob Transvaginal; Future    Hypothyroidism due to Hashimoto's thyroiditis    PCOS (polycystic ovarian syndrome)    Encounter for initial prescription of contraceptive pills    Unwanted fertility    Other orders  -     vitamin D (ERGOCALCIFEROL) 00709 units capsule capsule  -     hydroCHLOROthiazide (HYDRODIURIL) 25 MG tablet  -     potassium chloride (K-DUR,KLOR-CON) 20 MEQ CR tablet  -     norethindrone-ethinyl estradiol FE (JUNEL FE 1/20) 1-20 MG-MCG per tablet; Take 3 tabs PO daily x3 days then 2 tabs PO x2 days then 1 tab daily thereafter.  -     norethindrone-ethinyl estradiol FE (JUNEL FE 1/20) 1-20 MG-MCG per tablet; Take 1 tablet by mouth Daily.        New Medications Ordered This Visit   Medications   • norethindrone-ethinyl estradiol FE (JUNEL FE 1/20) 1-20 MG-MCG per tablet     Sig: Take 3 tabs PO daily x3 days then 2 tabs PO x2 days then 1 tab daily thereafter.     Dispense:  28 tablet     Refill:  0   • norethindrone-ethinyl estradiol FE (JUNEL FE 1/20) 1-20 MG-MCG per tablet     Sig: Take 1 tablet by mouth Daily.     Dispense:  28 tablet     Refill:  12     PUT ON HOLD FOR NOVEMBER     SHE NEEDS A PAP SMEAR (LAST WAS IN 2011). Deferred today due to heavier bleeding. Will start high dose OCP (3x3, 2x2, then 1 daily) x1 month then daily OCP thereafter. D/C Depo Provera at this time. She is interested in having an ablation and states that she is absolutely certain that she does not want any more children. Discussed tubal ligation. Discussed that this is a permanent form of birth control.  Discussed chance of regret.  Discussed other options  including LARCs and OCPs. Patient declined, would like tubal ligation. Discussed the chance of failure, quoting 13 in 1000 over 5 year period, discussed that if failure occurred that she is at higher risk for ectopic pregnancy. Discussed lapaproscopic approach.  Tubal consents signed today. She wants an endometrial ablation as well to help decrease her bleeding. Written information was provided on all of these options. She will have labs today and u/s with f/u to discuss the ablation in 1 month. Encouraged her to schedule an appt with endocrinology for f/u ASAP.    This note was electronically signed.    Ana Roberson, APRN    October 3, 2019

## 2019-10-04 ENCOUNTER — TELEPHONE (OUTPATIENT)
Dept: OBSTETRICS AND GYNECOLOGY | Facility: CLINIC | Age: 29
End: 2019-10-04

## 2019-10-04 DIAGNOSIS — R73.09 ELEVATED HEMOGLOBIN A1C: Primary | ICD-10-CM

## 2019-10-04 LAB
ALBUMIN SERPL-MCNC: 4.6 G/DL (ref 3.5–5.2)
ALBUMIN/GLOB SERPL: 1.2 G/DL
ALP SERPL-CCNC: 78 U/L (ref 39–117)
ALT SERPL W P-5'-P-CCNC: 25 U/L (ref 1–33)
ANION GAP SERPL CALCULATED.3IONS-SCNC: 19.4 MMOL/L (ref 5–15)
AST SERPL-CCNC: 29 U/L (ref 1–32)
BILIRUB SERPL-MCNC: 0.5 MG/DL (ref 0.2–1.2)
BUN BLD-MCNC: 7 MG/DL (ref 6–20)
BUN/CREAT SERPL: 8.1 (ref 7–25)
CALCIUM SPEC-SCNC: 9.2 MG/DL (ref 8.6–10.5)
CHLORIDE SERPL-SCNC: 100 MMOL/L (ref 98–107)
CO2 SERPL-SCNC: 19.6 MMOL/L (ref 22–29)
CREAT BLD-MCNC: 0.86 MG/DL (ref 0.57–1)
GFR SERPL CREATININE-BSD FRML MDRD: 77 ML/MIN/1.73
GLOBULIN UR ELPH-MCNC: 3.7 GM/DL
GLUCOSE BLD-MCNC: 134 MG/DL (ref 65–99)
INSULIN SERPL-ACNC: 868.2 UIU/ML (ref 2.6–24.9)
POTASSIUM BLD-SCNC: 3.7 MMOL/L (ref 3.5–5.2)
PROT SERPL-MCNC: 8.3 G/DL (ref 6–8.5)
SODIUM BLD-SCNC: 139 MMOL/L (ref 136–145)

## 2019-10-04 NOTE — TELEPHONE ENCOUNTER
----- Message from SANDRA Enrique sent at 10/4/2019 10:47 AM CDT -----  Please let her know that her TSH is better than it was, but still elevated at 14. She also has diabetes and needs to bee seen by Leon Julio for both the thyroid and diabetes issues. She's already established with her so she just needs to make an appointment. She's not anemic so she doesn't need any other changes at this time.

## 2019-10-28 RX ORDER — NORETHINDRONE ACETATE AND ETHINYL ESTRADIOL 1MG-20(21)
KIT ORAL
Qty: 28 TABLET | Refills: 0 | OUTPATIENT
Start: 2019-10-28

## 2020-05-12 ENCOUNTER — TRANSCRIBE ORDERS (OUTPATIENT)
Dept: ADMINISTRATIVE | Facility: HOSPITAL | Age: 30
End: 2020-05-12

## 2020-05-12 DIAGNOSIS — R51.9 NONINTRACTABLE HEADACHE, UNSPECIFIED CHRONICITY PATTERN, UNSPECIFIED HEADACHE TYPE: ICD-10-CM

## 2020-05-12 DIAGNOSIS — H93.19 TINNITUS, UNSPECIFIED LATERALITY: ICD-10-CM

## 2020-05-12 DIAGNOSIS — R42 VERTIGO: Primary | ICD-10-CM

## 2020-05-15 ENCOUNTER — APPOINTMENT (OUTPATIENT)
Dept: MRI IMAGING | Facility: HOSPITAL | Age: 30
End: 2020-05-15

## 2020-05-22 ENCOUNTER — HOSPITAL ENCOUNTER (OUTPATIENT)
Dept: MRI IMAGING | Facility: HOSPITAL | Age: 30
Discharge: HOME | End: 2020-05-22
Admitting: NURSE PRACTITIONER

## 2020-05-22 DIAGNOSIS — R51.9 NONINTRACTABLE HEADACHE, UNSPECIFIED CHRONICITY PATTERN, UNSPECIFIED HEADACHE TYPE: ICD-10-CM

## 2020-05-22 DIAGNOSIS — H93.19 TINNITUS, UNSPECIFIED LATERALITY: ICD-10-CM

## 2020-05-22 DIAGNOSIS — R42 VERTIGO: ICD-10-CM

## 2020-05-22 LAB — CREAT BLDA-MCNC: 0.8 MG/DL (ref 0.6–1.3)

## 2020-05-22 PROCEDURE — A9577 INJ MULTIHANCE: HCPCS | Performed by: NURSE PRACTITIONER

## 2020-05-22 PROCEDURE — 0 GADOBENATE DIMEGLUMINE 529 MG/ML SOLUTION: Performed by: NURSE PRACTITIONER

## 2020-05-22 PROCEDURE — 82565 ASSAY OF CREATININE: CPT

## 2020-05-22 PROCEDURE — 70553 MRI BRAIN STEM W/O & W/DYE: CPT

## 2020-05-22 RX ADMIN — GADOBENATE DIMEGLUMINE 20 ML: 529 INJECTION, SOLUTION INTRAVENOUS at 13:10

## 2022-08-04 ENCOUNTER — APPOINTMENT (OUTPATIENT)
Dept: GENERAL RADIOLOGY | Age: 32
End: 2022-08-04
Payer: MEDICAID

## 2022-08-04 ENCOUNTER — HOSPITAL ENCOUNTER (EMERGENCY)
Age: 32
Discharge: HOME | End: 2022-08-04
Attending: EMERGENCY MEDICINE
Payer: MEDICAID

## 2022-08-04 VITALS
DIASTOLIC BLOOD PRESSURE: 78 MMHG | SYSTOLIC BLOOD PRESSURE: 116 MMHG | OXYGEN SATURATION: 96 % | HEIGHT: 66 IN | BODY MASS INDEX: 47.09 KG/M2 | WEIGHT: 293 LBS | TEMPERATURE: 97.6 F | RESPIRATION RATE: 16 BRPM | HEART RATE: 64 BPM

## 2022-08-04 DIAGNOSIS — R07.89 ATYPICAL CHEST PAIN: ICD-10-CM

## 2022-08-04 DIAGNOSIS — G43.909 MIGRAINE WITHOUT STATUS MIGRAINOSUS, NOT INTRACTABLE, UNSPECIFIED MIGRAINE TYPE: Primary | ICD-10-CM

## 2022-08-04 LAB
ALBUMIN SERPL-MCNC: 4.8 G/DL (ref 3.5–5.2)
ALP BLD-CCNC: 78 U/L (ref 35–104)
ALT SERPL-CCNC: 84 U/L (ref 5–33)
ANION GAP SERPL CALCULATED.3IONS-SCNC: 13 MMOL/L (ref 7–19)
AST SERPL-CCNC: 103 U/L (ref 5–32)
BASOPHILS ABSOLUTE: 0.1 K/UL (ref 0–0.2)
BASOPHILS RELATIVE PERCENT: 0.9 % (ref 0–1)
BILIRUB SERPL-MCNC: 0.8 MG/DL (ref 0.2–1.2)
BUN BLDV-MCNC: 24 MG/DL (ref 6–20)
CALCIUM SERPL-MCNC: 9.1 MG/DL (ref 8.6–10)
CHLORIDE BLD-SCNC: 99 MMOL/L (ref 98–111)
CO2: 27 MMOL/L (ref 22–29)
CREAT SERPL-MCNC: 1 MG/DL (ref 0.5–0.9)
EOSINOPHILS ABSOLUTE: 0.1 K/UL (ref 0–0.6)
EOSINOPHILS RELATIVE PERCENT: 0.3 % (ref 0–5)
GFR AFRICAN AMERICAN: >59
GFR NON-AFRICAN AMERICAN: >60
GLUCOSE BLD-MCNC: 89 MG/DL (ref 74–109)
HCG QUALITATIVE: NEGATIVE
HCT VFR BLD CALC: 48.3 % (ref 37–47)
HEMOGLOBIN: 15.5 G/DL (ref 12–16)
IMMATURE GRANULOCYTES #: 1 K/UL
LYMPHOCYTES ABSOLUTE: 4.5 K/UL (ref 1.1–4.5)
LYMPHOCYTES RELATIVE PERCENT: 30.8 % (ref 20–40)
MCH RBC QN AUTO: 29.6 PG (ref 27–31)
MCHC RBC AUTO-ENTMCNC: 32.1 G/DL (ref 33–37)
MCV RBC AUTO: 92.4 FL (ref 81–99)
MONOCYTES ABSOLUTE: 0.9 K/UL (ref 0–0.9)
MONOCYTES RELATIVE PERCENT: 5.9 % (ref 0–10)
NEUTROPHILS ABSOLUTE: 8.1 K/UL (ref 1.5–7.5)
NEUTROPHILS RELATIVE PERCENT: 55.5 % (ref 50–65)
PDW BLD-RTO: 17.1 % (ref 11.5–14.5)
PLATELET # BLD: 253 K/UL (ref 130–400)
PMV BLD AUTO: 8.7 FL (ref 9.4–12.3)
POTASSIUM SERPL-SCNC: 3.3 MMOL/L (ref 3.5–5)
RBC # BLD: 5.23 M/UL (ref 4.2–5.4)
SODIUM BLD-SCNC: 139 MMOL/L (ref 136–145)
TOTAL PROTEIN: 7.8 G/DL (ref 6.6–8.7)
TROPONIN: <0.01 NG/ML (ref 0–0.03)
WBC # BLD: 14.6 K/UL (ref 4.8–10.8)

## 2022-08-04 PROCEDURE — 36415 COLL VENOUS BLD VENIPUNCTURE: CPT

## 2022-08-04 PROCEDURE — 96375 TX/PRO/DX INJ NEW DRUG ADDON: CPT

## 2022-08-04 PROCEDURE — 99285 EMERGENCY DEPT VISIT HI MDM: CPT

## 2022-08-04 PROCEDURE — 84703 CHORIONIC GONADOTROPIN ASSAY: CPT

## 2022-08-04 PROCEDURE — 93005 ELECTROCARDIOGRAM TRACING: CPT | Performed by: EMERGENCY MEDICINE

## 2022-08-04 PROCEDURE — 80053 COMPREHEN METABOLIC PANEL: CPT

## 2022-08-04 PROCEDURE — 6360000002 HC RX W HCPCS: Performed by: EMERGENCY MEDICINE

## 2022-08-04 PROCEDURE — 84484 ASSAY OF TROPONIN QUANT: CPT

## 2022-08-04 PROCEDURE — 96374 THER/PROPH/DIAG INJ IV PUSH: CPT

## 2022-08-04 PROCEDURE — 71045 X-RAY EXAM CHEST 1 VIEW: CPT

## 2022-08-04 PROCEDURE — 85025 COMPLETE CBC W/AUTO DIFF WBC: CPT

## 2022-08-04 RX ORDER — PROCHLORPERAZINE EDISYLATE 5 MG/ML
10 INJECTION INTRAMUSCULAR; INTRAVENOUS ONCE
Status: COMPLETED | OUTPATIENT
Start: 2022-08-04 | End: 2022-08-04

## 2022-08-04 RX ORDER — DIPHENHYDRAMINE HYDROCHLORIDE 50 MG/ML
50 INJECTION INTRAMUSCULAR; INTRAVENOUS ONCE
Status: COMPLETED | OUTPATIENT
Start: 2022-08-04 | End: 2022-08-04

## 2022-08-04 RX ADMIN — DIPHENHYDRAMINE HYDROCHLORIDE 50 MG: 50 INJECTION, SOLUTION INTRAMUSCULAR; INTRAVENOUS at 19:34

## 2022-08-04 RX ADMIN — PROCHLORPERAZINE EDISYLATE 10 MG: 5 INJECTION INTRAMUSCULAR; INTRAVENOUS at 19:34

## 2022-08-04 ASSESSMENT — PAIN - FUNCTIONAL ASSESSMENT: PAIN_FUNCTIONAL_ASSESSMENT: 0-10

## 2022-08-04 ASSESSMENT — PAIN DESCRIPTION - LOCATION: LOCATION: HEAD;ARM;CHEST

## 2022-08-04 ASSESSMENT — ENCOUNTER SYMPTOMS
COUGH: 0
DIARRHEA: 0
ABDOMINAL PAIN: 0
VOMITING: 0
BACK PAIN: 0
RHINORRHEA: 0
NAUSEA: 0
SHORTNESS OF BREATH: 0
SORE THROAT: 0

## 2022-08-04 ASSESSMENT — PAIN SCALES - GENERAL
PAINLEVEL_OUTOF10: 6
PAINLEVEL_OUTOF10: 8

## 2022-08-05 LAB
EKG P AXIS: 40 DEGREES
EKG P-R INTERVAL: 162 MS
EKG Q-T INTERVAL: 394 MS
EKG QRS DURATION: 90 MS
EKG QTC CALCULATION (BAZETT): 404 MS
EKG T AXIS: 40 DEGREES

## 2022-08-05 NOTE — ED PROVIDER NOTES
[facility] EMERGENCY DEPT  eMERGENCY dEPARTMENT eNCOUnter      Pt Name: Joann Garza  MRN: [id number]  [place] 1990  Date of evaluation: 8/4/2022  Provider: Marv Saavedra MD    CHIEF COMPLAINT       Chief Complaint   Patient presents with    Chest Pain     Points to mid chest describes as \"sharp and burning\"    Headache     States that the pain is running down her right arm as well. States this has ferny going on for about a week and was released from [facility]. HISTORY OF PRESENT ILLNESS   (Location/Symptom, Timing/Onset,Context/Setting, Quality, Duration, Modifying Factors, Severity)  Note limiting factors. Joann Garza is a 34 y.o. female who presents to the emergency department for 8 days of headache and chest pain. Patient states that she was admitted from Saturday until yesterday Wednesday. They are working her up for her chest pain and headache and she tells me that she had labs over overall normal and had a negative CT scan of her head. Supposedly she was told she may need an MRI. She admits to frequent headaches as well as photophobia. Headache is unilateral on the right side. She denies any vomiting or diarrhea. In regards to her chest pain she states that it is central burning type sensation often last 15 minutes nonexertional will then radiate up into her head and down into her right arm and when this occurs it feels like her whole body is on fire. They started her on gabapentin at the outside hospital.    HPI    NursingNotes were reviewed. REVIEW OF SYSTEMS    (2-9 systems for level 4, 10 or more for level 5)     Review of Systems   Constitutional:  Negative for chills and fever. HENT:  Negative for rhinorrhea and sore throat. Respiratory:  Negative for cough and shortness of breath. Cardiovascular:  Positive for chest pain. Negative for leg swelling. Gastrointestinal:  Negative for abdominal pain, diarrhea, nausea and vomiting.    Genitourinary: Negative for dysuria, frequency and urgency. Musculoskeletal:  Negative for back pain, neck pain and neck stiffness. Neurological:  Positive for headaches. Negative for dizziness, syncope, facial asymmetry, speech difficulty, weakness and numbness. All other systems reviewed and are negative. PAST MEDICALHISTORY     Past Medical History:   Diagnosis Date    Anemia     Hypertension     Hypothyroidism     Vitamin D deficiency          SURGICAL HISTORY       Past Surgical History:   Procedure Laterality Date     SECTION           CURRENT MEDICATIONS     Previous Medications    HYDROCHLOROTHIAZIDE (HYDRODIURIL) 25 MG TABLET        IRON POLYSACCHARIDES (NIFEREX) 150 MG CAPSULE    Take 150 mg by mouth    METFORMIN (GLUCOPHAGE) 500 MG TABLET    Take 500 mg by mouth    POTASSIUM CHLORIDE (KLOR-CON M) 20 MEQ EXTENDED RELEASE TABLET        SYNTHROID 175 MCG TABLET        SYNTHROID 300 MCG TABLET        VITAMIN D (ERGOCALCIFEROL) 97060 UNITS CAPS CAPSULE           ALLERGIES     Erythromycin and Sulfamethoxazole-trimethoprim    FAMILY HISTORY     History reviewed. No pertinent family history.        SOCIAL HISTORY       Social History     Socioeconomic History    Marital status: Single     Spouse name: None    Number of children: 1    Years of education: 9    Highest education level: None   Occupational History    Occupation: student   Tobacco Use    Smoking status: Never    Smokeless tobacco: Never   Vaping Use    Vaping Use: Never used   Substance and Sexual Activity    Alcohol use: Never    Drug use: Never    Sexual activity: Yes       SCREENINGS    Harriet Coma Scale  Eye Opening: Spontaneous  Best Verbal Response: Oriented  Best Motor Response: Obeys commands  [place] Coma Scale Score: 15        PHYSICAL EXAM    (up to 7 for level 4, 8 or more for level 5)     ED Triage Vitals [22 1854]   BP Temp Temp Source Heart Rate Resp SpO2 Height Weight   137/83 97.6 °F (36.4 °C) Temporal 74 18 97 % 5' 6\" (1.676 m) (!) 350 lb (158.8 kg)       Physical Exam  Vitals and nursing note reviewed. Constitutional:       General: She is not in acute distress. Appearance: Normal appearance. She is well-developed. She is obese. She is not ill-appearing. HENT:      Head: Normocephalic and atraumatic. Right Ear: External ear normal.      Left Ear: External ear normal.      Nose: Nose normal.      Mouth/Throat:      Mouth: Mucous membranes are moist.   Eyes:      Extraocular Movements: Extraocular movements intact. Conjunctiva/sclera: Conjunctivae normal.      Pupils: Pupils are equal, round, and reactive to light. Neck:      Trachea: No tracheal deviation. Cardiovascular:      Rate and Rhythm: Normal rate and regular rhythm. Heart sounds: Normal heart sounds. No murmur heard. Pulmonary:      Effort: No respiratory distress. Breath sounds: Normal breath sounds. No wheezing or rales. Abdominal:      Palpations: Abdomen is soft. Tenderness: There is no abdominal tenderness. Musculoskeletal:         General: Normal range of motion. Cervical back: Normal range of motion. No rigidity or bony tenderness. Thoracic back: No bony tenderness. Lumbar back: No bony tenderness. Skin:     General: Skin is warm and dry. Neurological:      Mental Status: She is alert and oriented to person, place, and time. GCS: GCS eye subscore is 4. GCS verbal subscore is 5. GCS motor subscore is 6. Cranial Nerves: No dysarthria or facial asymmetry. Sensory: Sensation is intact. Motor: No weakness or abnormal muscle tone. Coordination: Coordination is intact.        DIAGNOSTIC RESULTS     EKG: All EKG's areinterpreted by the Emergency Department Physician who either signs or Co-signs this chart in the absence of a cardiologist.    66 normal sinus rhythm no obvious ST changes nondiagnostic EKG    RADIOLOGY:  Non-plain film images such as CT, Ultrasound and MRI are read by the radiologist. Amari Angel radiographic images are visualized and preliminarily interpreted bythe emergency physician with the below findings:      XR CHEST PORTABLE   Final Result   1. No acute process in the chest.              LABS:  Labs Reviewed   CBC WITH AUTO DIFFERENTIAL - Abnormal; Notable for the following components:       Result Value    WBC 14.6 (*)     Hematocrit 48.3 (*)     MCHC 32.1 (*)     RDW 17.1 (*)     MPV 8.7 (*)     Neutrophils Absolute 8.1 (*)     All other components within normal limits   COMPREHENSIVE METABOLIC PANEL - Abnormal; Notable for the following components:    Potassium 3.3 (*)     BUN 24 (*)     Creatinine 1.0 (*)     ALT 84 (*)      (*)     All other components within normal limits   HCG, SERUM, QUALITATIVE   TROPONIN       All other labs were within normal range or not returned as of this dictation. EMERGENCY DEPARTMENT COURSE and DIFFERENTIAL DIAGNOSIS/MDM:   Vitals:    Vitals:    08/04/22 1854   BP: 137/83   Pulse: 74   Resp: 18   Temp: 97.6 °F (36.4 °C)   TempSrc: Temporal   SpO2: 97%   Weight: (!) 350 lb (158.8 kg)   Height: 5' 6\" (1.676 m)       MDM    Recently admitted for the past 5 days at [facility] and discharged for same complaints. Tells me she had a negative CT of her head and work-up there told she possibly needs an MRI which I suspect possibly is why she presented here as she was hoping to get an MRI tonight. No indication for emergent MRI tonight. Requested records from [facility] multiple times and unfortunately never received any however her headache has basically resolved after migraine cocktail and given her presentation description of her headache suspect this likely was a migraine headache. Atypical sharp chest pain troponin negative EKG no obvious acute changes. No concern for PE or dissection. Overall patient is feeling much better. Understands close follow-up and return precautions.     CONSULTS:  None    PROCEDURES:  Unless

## 2022-08-25 ENCOUNTER — TELEPHONE (OUTPATIENT)
Dept: NEUROLOGY | Age: 32
End: 2022-08-25

## 2022-08-25 NOTE — TELEPHONE ENCOUNTER
Attempted to contact pt to schedule a new pt referral appt. Unable to reach pt, Lt vm to contact office back to make an appt.

## 2022-09-01 NOTE — TELEPHONE ENCOUNTER
I left a voicemail for the patient to let them know we have received their referral. Asked for them to call us back to get the appointment scheduled, left call back number.

## 2022-09-27 ENCOUNTER — OFFICE VISIT (OUTPATIENT)
Dept: NEUROLOGY | Age: 32
End: 2022-09-27
Payer: MEDICAID

## 2022-09-27 VITALS — BODY MASS INDEX: 47.09 KG/M2 | WEIGHT: 293 LBS | HEIGHT: 66 IN

## 2022-09-27 DIAGNOSIS — M25.511 RIGHT SHOULDER PAIN, UNSPECIFIED CHRONICITY: ICD-10-CM

## 2022-09-27 DIAGNOSIS — R51.9 NONINTRACTABLE HEADACHE, UNSPECIFIED CHRONICITY PATTERN, UNSPECIFIED HEADACHE TYPE: Primary | ICD-10-CM
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
## 2022-09-27 DIAGNOSIS — M54.2 PAIN, NECK: ICD-10-CM

## 2022-09-27 DIAGNOSIS — S09.90XS INJURY OF HEAD, SEQUELA: ICD-10-CM

## 2022-09-27 PROBLEM — S09.90XA HEAD INJURY: Status: ACTIVE | Noted: 2022-09-27
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
## 2022-09-27 PROCEDURE — 99204 OFFICE O/P NEW MOD 45 MIN: CPT | Performed by: PSYCHIATRY & NEUROLOGY

## 2022-09-27 PROCEDURE — G8427 DOCREV CUR MEDS BY ELIG CLIN: HCPCS | Performed by: PSYCHIATRY & NEUROLOGY

## 2022-09-27 PROCEDURE — G8417 CALC BMI ABV UP PARAM F/U: HCPCS | Performed by: PSYCHIATRY & NEUROLOGY

## 2022-09-27 PROCEDURE — 1036F TOBACCO NON-USER: CPT | Performed by: PSYCHIATRY & NEUROLOGY

## 2022-09-27 RX ORDER — CARBAMAZEPINE 200 MG/1
200 TABLET ORAL 3 TIMES DAILY
COMMUNITY

## 2022-09-27 RX ORDER — LORAZEPAM 2 MG/1
2 TABLET ORAL
Qty: 4 TABLET | Refills: 0 | Status: SHIPPED | OUTPATIENT
Start: 2022-09-27 | End: 2022-10-27

## 2022-09-27 RX ORDER — ESCITALOPRAM OXALATE 20 MG/1
20 TABLET ORAL DAILY
COMMUNITY

## 2022-09-27 NOTE — PROGRESS NOTES
Chief Complaint   Patient presents with    New Patient     Pt states she has been having a lot of pain in her neck at the base of her head and it goes down her right arm        Igor Bran is a 34y.o. year old female who is seen for evaluation of pain in the back of the head,right shoulder, and right arm. The patient decayed in July she fell the porch and rolled onto the steps. She hit her head on a 2 x 4 railing and concrete on the right side of her head. A few days following this began with pain at the back top of her head which radiated down to the upper shoulder and back. The pain does go down the back of the arm on the right. She denies any clear numbness or weakness. She does have a history of rotator cuff surgery previously. This is intermittent and is triggered by movement of the shoulder and neck. She was tried on Neurontin and Tegretol without benefit. Active Ambulatory Problems     Diagnosis Date Noted    Hypertension 2019    Anemia 2019    Vitamin D deficiency 2019    Hypothyroidism 2019    Nonintractable headache 2022    Pain, neck 2022    Right shoulder pain 2022    Fall 2022    Head injury 2022     Resolved Ambulatory Problems     Diagnosis Date Noted    No Resolved Ambulatory Problems     No Additional Past Medical History       Past Surgical History:   Procedure Laterality Date     SECTION      CHOLECYSTECTOMY         No family history on file.     Allergies   Allergen Reactions    Erythromycin Rash    Sulfamethoxazole-Trimethoprim Rash       Social History     Socioeconomic History    Marital status: Single     Spouse name: Not on file    Number of children: 1    Years of education: 9    Highest education level: Not on file   Occupational History    Occupation: student   Tobacco Use    Smoking status: Never    Smokeless tobacco: Never   Vaping Use    Vaping Use: Never used   Substance and Sexual Activity    Alcohol use: Never    Drug use: Never    Sexual activity: Yes   Other Topics Concern    Not on file   Social History Narrative    Not on file     Social Determinants of Health     Financial Resource Strain: Not on file   Food Insecurity: Not on file   Transportation Needs: Not on file   Physical Activity: Not on file   Stress: Not on file   Social Connections: Not on file   Intimate Partner Violence: Not on file   Housing Stability: Not on file       Review of Systems     Constitutional - No fever or chills. No diaphoresis or significant fatigue. HENT -  No tinnitus or significant hearing loss. Eyes - no sudden vision change or eye pain  Respiratory - no significant shortness of breath or cough  Cardiovascular - no chest pain No palpitations or significant leg swelling  Gastrointestinal - no abdominal swelling or pain. Genitourinary - No difficulty urinating, dysuria  Musculoskeletal - yes back pain or myalgia. Skin - no color change or rash  Neurologic - No seizures. No lateralizing weakness. Hematologic - no easy bruising or excessive bleeding. Psychiatric - yes severe anxiety or nervousness. All other review of systems are negative. Current Outpatient Medications   Medication Sig Dispense Refill    carBAMazepine (TEGRETOL) 200 MG tablet Take 200 mg by mouth 3 times daily      escitalopram (LEXAPRO) 20 MG tablet Take 20 mg by mouth daily      LORazepam (ATIVAN) 2 MG tablet Take 1 tablet by mouth every hour as needed for Anxiety (can repeat in 30 minutes for MRI) for up to 30 days.  4 tablet 0    iron polysaccharides (NIFEREX) 150 MG capsule Take 150 mg by mouth      potassium chloride (KLOR-CON M) 20 MEQ extended release tablet       metFORMIN (GLUCOPHAGE) 500 MG tablet Take 500 mg by mouth      SYNTHROID 175 MCG tablet 200 mcg      SYNTHROID 300 MCG tablet       hydrochlorothiazide (HYDRODIURIL) 25 MG tablet       vitamin D (ERGOCALCIFEROL) 13966 units CAPS capsule        No current facility-administered medications for this visit. Ht 5' 6\" (1.676 m)   Wt (!) 350 lb (158.8 kg)   BMI 56.49 kg/m²     Constitutional - well developed, well nourished. Eyes - conjunctiva normal.  Pupils not tested  Ear, nose, throat -hearing intact to finger rub No scars, masses, or lesions over external nose or ears, no atrophy of tongue  Neck-symmetric, no masses noted, no jugular vein distension  Respiration- chest wall appears symmetric, good expansion,   normal effort without use of accessory muscles  Musculoskeletal - no significant wasting of muscles noted, no bony deformities  Extremities-no clubbing, cyanosis or edema  Skin - warm, dry, and intact. No rash, erythema, or pallor.   Psychiatric - mood, affect, and behavior appear normal.      Neurological exam  Awake, alert, fluent oriented x 3 appropriate affect  Attention and concentration appear appropriate  Recent and remote memory appears unremarkable  Speech normal without dysarthria  No clear issues with language of fund of knowledge    Cranial Nerve Exam   CN II- Visual fields grossly unremarkable  CN III, IV,VI-EOMI, No nystagmus, conjugate eye movements, no ptosis  CN V-sensation intact to LT over face  CN VII-no facial assymetry  CN VIII-Hearing intact to finger rub  CN IX and X- Palate not tested  CN XI-not test shoulder shrug  CN XII-Tongue midline with no fasciculations or fibrillations    Motor Exam  Some Giveaway weakness in the right shoulder  no cogwheeling, normal tone    Sensory Exam  Sensation intact to light touch and temperature upper and lower extremities bilaterally    Reflexes   2+ biceps bilaterally  2+ brachioradialis  2+patella  2+ ankle jerks  No clonus ankles  No Veras's sign bilateral hands    Tremors- no tremors in hands or head noted    Gait  Normal base and speed  No ataxia    Coordination  Finger to nose-unremarkable    No results found for: [id number]  Lab Results   Component Value Date    WBC 14.6 (H) 08/04/2022    HGB 15.5 08/04/2022    HCT 48.3 (H) 08/04/2022    MCV 92.4 08/04/2022     08/04/2022     Lab Results   Component Value Date     08/04/2022    K 3.3 (L) 08/04/2022    CL 99 08/04/2022    CO2 27 08/04/2022    BUN 24 (H) 08/04/2022    CREATININE 1.0 (H) 08/04/2022    GLUCOSE 89 08/04/2022    CALCIUM 9.1 08/04/2022    PROT 7.8 08/04/2022    LABALBU 4.8 08/04/2022    BILITOT 0.8 08/04/2022    ALKPHOS 78 08/04/2022     (H) 08/04/2022    ALT 84 (H) 08/04/2022    LABGLOM >60 08/04/2022    GFRAA >59 08/04/2022           Assessment    ICD-10-CM    1. Nonintractable headache, unspecified chronicity pattern, unspecified headache type  R51.9 MRI BRAIN WO CONTRAST      2. Pain, neck  M54.2 MRI CERVICAL SPINE WO CONTRAST     LORazepam (ATIVAN) 2 MG tablet      3. Right shoulder pain, unspecified chronicity  M25.511 MRI SHOULDER RIGHT WO CONTRAST     LORazepam (ATIVAN) 2 MG tablet      4. Fall, sequela  W19. XXXS       5. Injury of head, sequela  S09.90XS           Her neurological examination today was relatively unremarkable except for some giveaway weakness in the right shoulder. At this time she is scheduled for an MRI of the brain, MRI of the cervical spine, and MRI of the right shoulder. Certain occipital neuralgia/neuralgia could result in her symptoms along with cervical radiculopathy. She was scheduled for nerve block to see if this provides benefit. The patient indicated understanding the management plan. She is to follow-up with the time of nerve block to determine what further management is warranted. Plan    Orders Placed This Encounter   Procedures    MRI BRAIN WO CONTRAST    MRI CERVICAL SPINE WO CONTRAST    MRI SHOULDER RIGHT WO CONTRAST         Orders Placed This Encounter   Medications    LORazepam (ATIVAN) 2 MG tablet     Sig: Take 1 tablet by mouth every hour as needed for Anxiety (can repeat in 30 minutes for MRI) for up to 30 days.      Dispense:  4 tablet     Refill:  0         Return if

## 2022-10-10 ENCOUNTER — HOSPITAL ENCOUNTER (OUTPATIENT)
Dept: MRI IMAGING | Age: 32
Discharge: HOME | End: 2022-10-10
Payer: MEDICAID

## 2022-10-10 DIAGNOSIS — R51.9 NONINTRACTABLE HEADACHE, UNSPECIFIED CHRONICITY PATTERN, UNSPECIFIED HEADACHE TYPE: ICD-10-CM

## 2022-10-10 DIAGNOSIS — M54.2 PAIN, NECK: ICD-10-CM

## 2022-10-10 DIAGNOSIS — M25.511 RIGHT SHOULDER PAIN, UNSPECIFIED CHRONICITY: ICD-10-CM

## 2022-10-10 PROCEDURE — 72141 MRI NECK SPINE W/O DYE: CPT | Performed by: RADIOLOGY

## 2022-10-10 PROCEDURE — 73221 MRI JOINT UPR EXTREM W/O DYE: CPT

## 2022-10-10 PROCEDURE — 70551 MRI BRAIN STEM W/O DYE: CPT

## 2022-10-10 PROCEDURE — 70551 MRI BRAIN STEM W/O DYE: CPT | Performed by: RADIOLOGY

## 2022-10-10 PROCEDURE — 72141 MRI NECK SPINE W/O DYE: CPT

## 2022-10-10 PROCEDURE — 73221 MRI JOINT UPR EXTREM W/O DYE: CPT | Performed by: RADIOLOGY
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
## 2022-11-15 ENCOUNTER — APPOINTMENT (OUTPATIENT)
Dept: GENERAL RADIOLOGY | Age: 32
End: 2022-11-15
Payer: MEDICAID

## 2022-11-15 ENCOUNTER — APPOINTMENT (OUTPATIENT)
Dept: CT IMAGING | Age: 32
End: 2022-11-15
Payer: MEDICAID

## 2022-11-15 ENCOUNTER — HOSPITAL ENCOUNTER (EMERGENCY)
Age: 32
Discharge: HOME | End: 2022-11-15
Payer: MEDICAID

## 2022-11-15 ENCOUNTER — TELEPHONE (OUTPATIENT)
Dept: NEUROLOGY | Age: 32
End: 2022-11-15

## 2022-11-15 VITALS
DIASTOLIC BLOOD PRESSURE: 81 MMHG | TEMPERATURE: 98.1 F | BODY MASS INDEX: 47.09 KG/M2 | WEIGHT: 293 LBS | HEIGHT: 66 IN | RESPIRATION RATE: 16 BRPM | SYSTOLIC BLOOD PRESSURE: 124 MMHG | HEART RATE: 73 BPM | OXYGEN SATURATION: 95 %

## 2022-11-15 DIAGNOSIS — E03.9 HYPOTHYROIDISM (ACQUIRED): Primary | ICD-10-CM

## 2022-11-15 DIAGNOSIS — R09.1 PLEURISY: ICD-10-CM

## 2022-11-15 LAB
ALBUMIN SERPL-MCNC: 4.5 G/DL (ref 3.5–5.2)
ALP BLD-CCNC: 104 U/L (ref 35–104)
ALT SERPL-CCNC: 29 U/L (ref 5–33)
ANION GAP SERPL CALCULATED.3IONS-SCNC: 10 MMOL/L (ref 7–19)
AST SERPL-CCNC: 32 U/L (ref 5–32)
BASOPHILS ABSOLUTE: 0.1 K/UL (ref 0–0.2)
BASOPHILS RELATIVE PERCENT: 0.5 % (ref 0–1)
BILIRUB SERPL-MCNC: 0.3 MG/DL (ref 0.2–1.2)
BUN BLDV-MCNC: 12 MG/DL (ref 6–20)
CALCIUM SERPL-MCNC: 9.6 MG/DL (ref 8.6–10)
CHLORIDE BLD-SCNC: 102 MMOL/L (ref 98–111)
CO2: 27 MMOL/L (ref 22–29)
CREAT SERPL-MCNC: 1 MG/DL (ref 0.5–0.9)
D DIMER: 0.67 UG/ML FEU (ref 0–0.48)
EOSINOPHILS ABSOLUTE: 0.1 K/UL (ref 0–0.6)
EOSINOPHILS RELATIVE PERCENT: 0.9 % (ref 0–5)
GFR SERPL CREATININE-BSD FRML MDRD: >60 ML/MIN/{1.73_M2}
GLUCOSE BLD-MCNC: 112 MG/DL (ref 74–109)
HCG QUALITATIVE: NEGATIVE
HCT VFR BLD CALC: 46.5 % (ref 37–47)
HEMOGLOBIN: 15 G/DL (ref 12–16)
IMMATURE GRANULOCYTES #: 0.1 K/UL
LYMPHOCYTES ABSOLUTE: 1.6 K/UL (ref 1.1–4.5)
LYMPHOCYTES RELATIVE PERCENT: 16.3 % (ref 20–40)
MCH RBC QN AUTO: 29.4 PG (ref 27–31)
MCHC RBC AUTO-ENTMCNC: 32.3 G/DL (ref 33–37)
MCV RBC AUTO: 91 FL (ref 81–99)
MONOCYTES ABSOLUTE: 0.5 K/UL (ref 0–0.9)
MONOCYTES RELATIVE PERCENT: 5.2 % (ref 0–10)
NEUTROPHILS ABSOLUTE: 7.4 K/UL (ref 1.5–7.5)
NEUTROPHILS RELATIVE PERCENT: 76.5 % (ref 50–65)
PDW BLD-RTO: 14.4 % (ref 11.5–14.5)
PLATELET # BLD: 208 K/UL (ref 130–400)
PMV BLD AUTO: 8.6 FL (ref 9.4–12.3)
POTASSIUM REFLEX MAGNESIUM: 3.8 MMOL/L (ref 3.5–5)
PRO-BNP: 23 PG/ML (ref 0–450)
RBC # BLD: 5.11 M/UL (ref 4.2–5.4)
SODIUM BLD-SCNC: 139 MMOL/L (ref 136–145)
T4 FREE: 0.9 NG/DL (ref 0.93–1.7)
TOTAL PROTEIN: 7.7 G/DL (ref 6.6–8.7)
TROPONIN: <0.01 NG/ML (ref 0–0.03)
TSH REFLEX FT4: 115 UIU/ML (ref 0.35–5.5)
WBC # BLD: 9.7 K/UL (ref 4.8–10.8)

## 2022-11-15 PROCEDURE — 71275 CT ANGIOGRAPHY CHEST: CPT

## 2022-11-15 PROCEDURE — 84703 CHORIONIC GONADOTROPIN ASSAY: CPT

## 2022-11-15 PROCEDURE — 71045 X-RAY EXAM CHEST 1 VIEW: CPT

## 2022-11-15 PROCEDURE — 36415 COLL VENOUS BLD VENIPUNCTURE: CPT

## 2022-11-15 PROCEDURE — 71045 X-RAY EXAM CHEST 1 VIEW: CPT | Performed by: RADIOLOGY

## 2022-11-15 PROCEDURE — 80053 COMPREHEN METABOLIC PANEL: CPT

## 2022-11-15 PROCEDURE — 84439 ASSAY OF FREE THYROXINE: CPT

## 2022-11-15 PROCEDURE — 93005 ELECTROCARDIOGRAM TRACING: CPT | Performed by: PHYSICIAN ASSISTANT

## 2022-11-15 PROCEDURE — 99285 EMERGENCY DEPT VISIT HI MDM: CPT

## 2022-11-15 PROCEDURE — 85379 FIBRIN DEGRADATION QUANT: CPT

## 2022-11-15 PROCEDURE — 71275 CT ANGIOGRAPHY CHEST: CPT | Performed by: RADIOLOGY

## 2022-11-15 PROCEDURE — 84484 ASSAY OF TROPONIN QUANT: CPT

## 2022-11-15 PROCEDURE — 85025 COMPLETE CBC W/AUTO DIFF WBC: CPT

## 2022-11-15 PROCEDURE — 83880 ASSAY OF NATRIURETIC PEPTIDE: CPT

## 2022-11-15 PROCEDURE — 6360000004 HC RX CONTRAST MEDICATION: Performed by: PHYSICIAN ASSISTANT

## 2022-11-15 PROCEDURE — 84443 ASSAY THYROID STIM HORMONE: CPT

## 2022-11-15 RX ORDER — PREDNISONE 10 MG/1
10 TABLET ORAL DAILY
Qty: 10 TABLET | Refills: 0 | Status: SHIPPED | OUTPATIENT
Start: 2022-11-15 | End: 2022-11-25

## 2022-11-15 RX ADMIN — IOPAMIDOL 90 ML: 755 INJECTION, SOLUTION INTRAVENOUS at 18:34

## 2022-11-15 ASSESSMENT — PAIN - FUNCTIONAL ASSESSMENT: PAIN_FUNCTIONAL_ASSESSMENT: 0-10

## 2022-11-15 ASSESSMENT — PAIN DESCRIPTION - LOCATION: LOCATION: BACK;CHEST

## 2022-11-15 ASSESSMENT — PAIN DESCRIPTION - DESCRIPTORS: DESCRIPTORS: BURNING;SHARP

## 2022-11-15 ASSESSMENT — PAIN SCALES - GENERAL: PAINLEVEL_OUTOF10: 9

## 2022-11-15 NOTE — TELEPHONE ENCOUNTER
Patient called stating that she has burning in her back, shoulders and chest. She also stated the pain makes her feel like she's going to urinate on herself. Pt states this pain has just come on suddenly. Pt states she went to [facility] ED yesterday and was given some muscle relaxers and sent home. I advised pt that she needs to report to the nearest ED if possible today since she is experiencing a high level of pain today and there is nothing we could do to alleviate her symptoms in office. Patient was also told that if she went to our ED we would have those records for Dr. Samaria Cortes to review. Patient was tearful but voiced agreement to going to the ED. Patient also requested Dr. Samaria Cortes order an MRI to rule out any new injury. I explained it may take awhile for central scheduling to get her in for this and the fastest way to get any imaging done is again through the ED. Pt voiced understanding. Do you want to order any testing at this time?

## 2022-11-16 LAB
EKG P AXIS: 54 DEGREES
EKG P-R INTERVAL: 168 MS
EKG Q-T INTERVAL: 388 MS
EKG QRS DURATION: 80 MS
EKG QTC CALCULATION (BAZETT): 403 MS
EKG T AXIS: 44 DEGREES

## 2022-11-16 PROCEDURE — 93010 ELECTROCARDIOGRAM REPORT: CPT | Performed by: INTERNAL MEDICINE

## 2022-11-16 ASSESSMENT — ENCOUNTER SYMPTOMS
NAUSEA: 0
PHOTOPHOBIA: 0
COLOR CHANGE: 0
SHORTNESS OF BREATH: 0
APNEA: 0
COUGH: 0
RHINORRHEA: 0
BACK PAIN: 0
ABDOMINAL DISTENTION: 0
SORE THROAT: 0
EYE DISCHARGE: 0
EYE PAIN: 0
ABDOMINAL PAIN: 0

## 2022-11-16 NOTE — TELEPHONE ENCOUNTER
I would suggest she go to the ER to rule out some other issues like a torn blood vessel. She had MRI of the brain,  C spine and shoulder already which did not show much. Does she want referral to pain management for injections?

## 2022-11-16 NOTE — ED PROVIDER NOTES
[facility] EMERGENCY DEPT  eMERGENCYdEPARTMENT eNCOUnter      Pt Name: Adelina Davenport  MRN: [id number]  [place] 1990  Date of evaluation: 11/15/2022  Provider:MISHA Dougherty    CHIEF COMPLAINT       Chief Complaint   Patient presents with    Chest Pain     Pt arrived to the ed with c/o chest pain that radiates to her back. Onset yesterday. Pt was seen at [facility] ER and pt states she was told it was her thyroid and to go home. HISTORY OF PRESENT ILLNESS  (Location/Symptom, Timing/Onset, Context/Setting, Quality, Duration, Modifying Factors, Severity.)   Adelina Davenport is a 34 y.o. female who presents to the emergency department with complaints of sternal discomfort onset yesterday was seen at [facility] ER told her it could be related to her thyroid she endorses a pleuritic aspect but no fever chills no dyspnea on exertion morbidly obese. Has known hypothyroidism takes Synthroid denies any blood clot history no edema in the legs no abdominal pain no recent sickness exposure. She takes metformin she does not take anything specific for cholesterol issues but does take an HCTZ possibly for blood pressure issues. 124/81 in triage. HPI    Nursing Notes were reviewed and I agree. REVIEW OF SYSTEMS    (2-9 systems for level 4, 10 or more for level 5)     Review of Systems   Constitutional:  Negative for activity change, appetite change, chills and fever. HENT:  Negative for congestion, postnasal drip, rhinorrhea and sore throat. Eyes:  Negative for photophobia, pain, discharge and visual disturbance. Respiratory:  Negative for apnea, cough and shortness of breath. Cardiovascular:  Positive for chest pain. Negative for leg swelling. Gastrointestinal:  Negative for abdominal distention, abdominal pain and nausea. Genitourinary:  Negative for vaginal bleeding. Musculoskeletal:  Negative for arthralgias, back pain, joint swelling, neck pain and neck stiffness.    Skin:  Negative for color change and rash. Neurological:  Negative for dizziness, syncope, facial asymmetry and headaches. Hematological:  Negative for adenopathy. Does not bruise/bleed easily. Psychiatric/Behavioral:  Negative for agitation, behavioral problems and confusion. Except as noted above the remainder of the review of systems was reviewed and negative. PAST MEDICAL HISTORY     Past Medical History:   Diagnosis Date    Anemia     Hypertension     Hypothyroidism     Vitamin D deficiency          SURGICAL HISTORY       Past Surgical History:   Procedure Laterality Date     SECTION      CHOLECYSTECTOMY           CURRENT MEDICATIONS       Discharge Medication List as of 11/15/2022  7:59 PM        CONTINUE these medications which have NOT CHANGED    Details   carBAMazepine (TEGRETOL) 200 MG tablet Take 200 mg by mouth 3 times dailyHistorical Med      escitalopram (LEXAPRO) 20 MG tablet Take 20 mg by mouth dailyHistorical Med      iron polysaccharides (NIFEREX) 150 MG capsule Take 150 mg by mouthHistorical Med      potassium chloride (KLOR-CON M) 20 MEQ extended release tablet Historical Med      metFORMIN (GLUCOPHAGE) 500 MG tablet Take 500 mg by mouthHistorical Med      !! SYNTHROID 175 MCG tablet 200 mcg, DAWHistorical Med      !! SYNTHROID 300 MCG tablet DAWHistorical Med      hydrochlorothiazide (HYDRODIURIL) 25 MG tablet Historical Med      vitamin D (ERGOCALCIFEROL) 84503 units CAPS capsule Historical Med       !! - Potential duplicate medications found. Please discuss with provider. ALLERGIES     Erythromycin and Sulfamethoxazole-trimethoprim    FAMILY HISTORY     History reviewed. No pertinent family history.        SOCIAL HISTORY       Social History     Socioeconomic History    Marital status: Single     Spouse name: None    Number of children: 1    Years of education: 9    Highest education level: None   Occupational History    Occupation: student   Tobacco Use    Smoking status: Never Smokeless tobacco: Never   Vaping Use    Vaping Use: Never used   Substance and Sexual Activity    Alcohol use: Never    Drug use: Never    Sexual activity: Yes       SCREENINGS    Harriet Coma Scale  Eye Opening: Spontaneous  Best Verbal Response: Oriented  Best Motor Response: Obeys commands  [place] Coma Scale Score: 15      PHYSICAL EXAM    (up to 7 forlevel 4, 8 or more for level 5)     ED Triage Vitals [11/15/22 1557]   BP Temp Temp Source Heart Rate Resp SpO2 Height Weight   (!) 172/94 98.1 °F (36.7 °C) Oral 70 22 98 % 5' 6\" (1.676 m) (!) 325 lb (147.4 kg)       Physical Exam  Vitals and nursing note reviewed. Constitutional:       General: She is not in acute distress. Appearance: She is well-developed. She is obese. She is not diaphoretic. HENT:      Head: Normocephalic and atraumatic. Right Ear: External ear normal.      Left Ear: External ear normal.      Mouth/Throat:      Pharynx: No oropharyngeal exudate. Eyes:      General:         Right eye: No discharge. Left eye: No discharge. Pupils: Pupils are equal, round, and reactive to light. Neck:      Thyroid: No thyromegaly. Cardiovascular:      Rate and Rhythm: Normal rate and regular rhythm. Heart sounds: Normal heart sounds. No murmur heard. No friction rub. Pulmonary:      Effort: Pulmonary effort is normal. No respiratory distress. Breath sounds: Normal breath sounds. No stridor. No wheezing. Abdominal:      General: Bowel sounds are normal. There is no distension. Palpations: Abdomen is soft. Tenderness: There is no abdominal tenderness. Musculoskeletal:         General: Normal range of motion. Cervical back: Normal range of motion and neck supple. Skin:     General: Skin is warm and dry. Capillary Refill: Capillary refill takes less than 2 seconds. Findings: No rash. Neurological:      Mental Status: She is alert and oriented to person, place, and time.       Cranial Nerves: No cranial nerve deficit. Sensory: No sensory deficit. Coordination: Coordination normal.   Psychiatric:         Behavior: Behavior normal.         Thought Content: Thought content normal.         DIAGNOSTIC RESULTS     RADIOLOGY:   Non-plain film images such as CT, Ultrasound and MRI are read by the radiologist. Plain radiographic images are visualized and preliminarilyinterpreted by No att. providers found with the below findings:        Interpretation per the Radiologist below, if available at the time of this note:    CTA PULMONARY W CONTRAST   Final Result   1. No acute pulmonary embolism. 2. Prior cholecystectomy. Multiple surgical clips in the gallbladder fossa. Recommendation: Follow up as clinically indicated. All CT scans at this facility utilize dose modulation, iterative reconstruction, and/or weight based dosing when appropriate to reduce radiation dose to as low as reasonably achievable. Electronically Signed by Jett Bullard MD, [address] [place] [address] at [id number] 08:48:57 PM EST               XR CHEST PORTABLE   Final Result   Above findings can be from lower respiratory tract infection which is viral versus reactive airway disease. Recommendation: Follow up as clinically indicated.     Electronically Signed by Jett Bullard MD, [facility] CERTIFIED at [id number] 06:34:49 PM EST                   LABS:  Labs Reviewed   D-DIMER, QUANTITATIVE - Abnormal; Notable for the following components:       Result Value    D-Dimer, Quant 0.67 (*)     All other components within normal limits   TSH WITH REFLEX TO FT4 - Abnormal; Notable for the following components:    TSH Reflex FT4 115.00 (*)     All other components within normal limits   CBC WITH AUTO DIFFERENTIAL - Abnormal; Notable for the following components:    MCHC 32.3 (*)     MPV 8.6 (*)     Neutrophils % 76.5 (*)     Lymphocytes % 16.3 (*)     All other components within normal limits   COMPREHENSIVE METABOLIC PANEL W/ REFLEX TO MG FOR LOW K - Abnormal; Notable for the following components:    Glucose 112 (*)     Creatinine 1.0 (*)     All other components within normal limits   T4, FREE - Abnormal; Notable for the following components:    T4 Free 0.90 (*)     All other components within normal limits   TROPONIN   BRAIN NATRIURETIC PEPTIDE   HCG, SERUM, QUALITATIVE       All other labs were within normal range or notreturned as of this dictation. RE-ASSESSMENT          EMERGENCY DEPARTMENT COURSE and DIFFERENTIAL DIAGNOSIS/MDM:   Vitals:    Vitals:    11/15/22 1832 11/15/22 1900 11/15/22 1930 11/15/22 2000   BP: 107/73 131/86 122/84 124/81   Pulse: 73      Resp: 16      Temp:       TempSrc:       SpO2: 94% 96% 96% 95%   Weight:       Height:         EKG normal sinus rhythm and rate no ST elevation noted. MDM  D-dimer elevated CTA negative plan will be for pleuritic treatment specifically this been ongoing now for over 24 hours do not feel a repeat troponin was indicated. She has a couple of risk factors but is only 34 with no troponin or EKG changes making her low heart score can follow closely with PCP for outpatient stress test possible Synthroid adjustment and we will start some prednisone for pain. PROCEDURES:    Procedures      FINAL IMPRESSION      1. Hypothyroidism (acquired)    2. Pleurisy          DISPOSITION/PLAN   DISPOSITION Decision To Discharge 11/15/2022 07:56:34 PM      PATIENT REFERRED TO:  [facility] EMERGENCY DEPT  Jamey Luis Carlosnissa  [phone]    If symptoms worsen    Aleda Nipple  [address]  [address] [facility]    Call         DISCHARGE MEDICATIONS:  Discharge Medication List as of 11/15/2022  7:59 PM        START taking these medications    Details   predniSONE (DELTASONE) 10 MG tablet Take 1 tablet by mouth daily for 10 days, Disp-10 tablet, R-0Normal             (Please note that portions of this note were completed with a voice recognition program.

## 2022-11-19 ENCOUNTER — APPOINTMENT (OUTPATIENT)
Dept: CT IMAGING | Facility: HOSPITAL | Age: 32
End: 2022-11-19

## 2022-11-19 ENCOUNTER — HOSPITAL ENCOUNTER (EMERGENCY)
Facility: HOSPITAL | Age: 32
Discharge: HOME | End: 2022-11-19
Attending: STUDENT IN AN ORGANIZED HEALTH CARE EDUCATION/TRAINING PROGRAM | Admitting: STUDENT IN AN ORGANIZED HEALTH CARE EDUCATION/TRAINING PROGRAM

## 2022-11-19 VITALS
DIASTOLIC BLOOD PRESSURE: 77 MMHG | TEMPERATURE: 98.1 F | SYSTOLIC BLOOD PRESSURE: 121 MMHG | WEIGHT: 293 LBS | HEIGHT: 65 IN | BODY MASS INDEX: 48.82 KG/M2 | RESPIRATION RATE: 18 BRPM | OXYGEN SATURATION: 98 % | HEART RATE: 76 BPM

## 2022-11-19 DIAGNOSIS — R20.2 PARESTHESIAS: ICD-10-CM

## 2022-11-19 DIAGNOSIS — M25.511 CHRONIC RIGHT SHOULDER PAIN: Primary | ICD-10-CM

## 2022-11-19 DIAGNOSIS — G89.29 CHRONIC RIGHT SHOULDER PAIN: Primary | ICD-10-CM

## 2022-11-19 LAB
ALBUMIN SERPL-MCNC: 4.5 G/DL (ref 3.5–5.2)
ALBUMIN/GLOB SERPL: 1.4 G/DL
ALP SERPL-CCNC: 93 U/L (ref 39–117)
ALT SERPL W P-5'-P-CCNC: 26 U/L (ref 1–33)
ANION GAP SERPL CALCULATED.3IONS-SCNC: 12 MMOL/L (ref 5–15)
AST SERPL-CCNC: 26 U/L (ref 1–32)
BASOPHILS # BLD AUTO: 0.04 10*3/MM3 (ref 0–0.2)
BASOPHILS NFR BLD AUTO: 0.3 % (ref 0–1.5)
BILIRUB SERPL-MCNC: 0.3 MG/DL (ref 0–1.2)
BUN SERPL-MCNC: 12 MG/DL (ref 6–20)
BUN/CREAT SERPL: 13.5 (ref 7–25)
CALCIUM SPEC-SCNC: 9.7 MG/DL (ref 8.6–10.5)
CHLORIDE SERPL-SCNC: 102 MMOL/L (ref 98–107)
CO2 SERPL-SCNC: 26 MMOL/L (ref 22–29)
CREAT SERPL-MCNC: 0.89 MG/DL (ref 0.57–1)
DEPRECATED RDW RBC AUTO: 46.3 FL (ref 37–54)
EGFRCR SERPLBLD CKD-EPI 2021: 87.9 ML/MIN/1.73
EOSINOPHIL # BLD AUTO: 0.02 10*3/MM3 (ref 0–0.4)
EOSINOPHIL NFR BLD AUTO: 0.2 % (ref 0.3–6.2)
ERYTHROCYTE [DISTWIDTH] IN BLOOD BY AUTOMATED COUNT: 14.4 % (ref 12.3–15.4)
GLOBULIN UR ELPH-MCNC: 3.2 GM/DL
GLUCOSE SERPL-MCNC: 92 MG/DL (ref 65–99)
HCT VFR BLD AUTO: 45.1 % (ref 34–46.6)
HGB BLD-MCNC: 15.2 G/DL (ref 12–15.9)
HOLD SPECIMEN: NORMAL
HOLD SPECIMEN: NORMAL
IMM GRANULOCYTES # BLD AUTO: 0.13 10*3/MM3 (ref 0–0.05)
IMM GRANULOCYTES NFR BLD AUTO: 1 % (ref 0–0.5)
LYMPHOCYTES # BLD AUTO: 2.38 10*3/MM3 (ref 0.7–3.1)
LYMPHOCYTES NFR BLD AUTO: 18.7 % (ref 19.6–45.3)
MCH RBC QN AUTO: 29.6 PG (ref 26.6–33)
MCHC RBC AUTO-ENTMCNC: 33.7 G/DL (ref 31.5–35.7)
MCV RBC AUTO: 87.7 FL (ref 79–97)
MONOCYTES # BLD AUTO: 0.77 10*3/MM3 (ref 0.1–0.9)
MONOCYTES NFR BLD AUTO: 6 % (ref 5–12)
NEUTROPHILS NFR BLD AUTO: 73.8 % (ref 42.7–76)
NEUTROPHILS NFR BLD AUTO: 9.41 10*3/MM3 (ref 1.7–7)
NRBC BLD AUTO-RTO: 0 /100 WBC (ref 0–0.2)
NT-PROBNP SERPL-MCNC: 7.5 PG/ML (ref 0–450)
PLATELET # BLD AUTO: 261 10*3/MM3 (ref 140–450)
PMV BLD AUTO: 8.6 FL (ref 6–12)
POTASSIUM SERPL-SCNC: 3.4 MMOL/L (ref 3.5–5.2)
PROT SERPL-MCNC: 7.7 G/DL (ref 6–8.5)
RBC # BLD AUTO: 5.14 10*6/MM3 (ref 3.77–5.28)
SODIUM SERPL-SCNC: 140 MMOL/L (ref 136–145)
TROPONIN T SERPL-MCNC: <0.01 NG/ML (ref 0–0.03)
TROPONIN T SERPL-MCNC: <0.01 NG/ML (ref 0–0.03)
WBC NRBC COR # BLD: 12.75 10*3/MM3 (ref 3.4–10.8)
WHOLE BLOOD HOLD COAG: NORMAL
WHOLE BLOOD HOLD SPECIMEN: NORMAL

## 2022-11-19 PROCEDURE — 80053 COMPREHEN METABOLIC PANEL: CPT | Performed by: STUDENT IN AN ORGANIZED HEALTH CARE EDUCATION/TRAINING PROGRAM

## 2022-11-19 PROCEDURE — 70450 CT HEAD/BRAIN W/O DYE: CPT

## 2022-11-19 PROCEDURE — 99284 EMERGENCY DEPT VISIT MOD MDM: CPT

## 2022-11-19 PROCEDURE — 96374 THER/PROPH/DIAG INJ IV PUSH: CPT

## 2022-11-19 PROCEDURE — 96375 TX/PRO/DX INJ NEW DRUG ADDON: CPT

## 2022-11-19 PROCEDURE — 85025 COMPLETE CBC W/AUTO DIFF WBC: CPT | Performed by: STUDENT IN AN ORGANIZED HEALTH CARE EDUCATION/TRAINING PROGRAM

## 2022-11-19 PROCEDURE — 93005 ELECTROCARDIOGRAM TRACING: CPT

## 2022-11-19 PROCEDURE — 83880 ASSAY OF NATRIURETIC PEPTIDE: CPT | Performed by: STUDENT IN AN ORGANIZED HEALTH CARE EDUCATION/TRAINING PROGRAM

## 2022-11-19 PROCEDURE — 36415 COLL VENOUS BLD VENIPUNCTURE: CPT

## 2022-11-19 PROCEDURE — 25010000002 MORPHINE PER 10 MG: Performed by: NURSE PRACTITIONER

## 2022-11-19 PROCEDURE — 93005 ELECTROCARDIOGRAM TRACING: CPT | Performed by: STUDENT IN AN ORGANIZED HEALTH CARE EDUCATION/TRAINING PROGRAM

## 2022-11-19 PROCEDURE — 93010 ELECTROCARDIOGRAM REPORT: CPT | Performed by: INTERNAL MEDICINE

## 2022-11-19 PROCEDURE — 84484 ASSAY OF TROPONIN QUANT: CPT | Performed by: STUDENT IN AN ORGANIZED HEALTH CARE EDUCATION/TRAINING PROGRAM

## 2022-11-19 PROCEDURE — 25010000002 ONDANSETRON PER 1 MG: Performed by: STUDENT IN AN ORGANIZED HEALTH CARE EDUCATION/TRAINING PROGRAM

## 2022-11-19 RX ORDER — GABAPENTIN 300 MG/1
300 CAPSULE ORAL 2 TIMES DAILY
Qty: 20 CAPSULE | Refills: 0 | Status: SHIPPED | OUTPATIENT
Start: 2022-11-19 | End: 2022-11-29

## 2022-11-19 RX ORDER — SODIUM CHLORIDE 0.9 % (FLUSH) 0.9 %
10 SYRINGE (ML) INJECTION AS NEEDED
Status: DISCONTINUED | OUTPATIENT
Start: 2022-11-19 | End: 2022-11-19 | Stop reason: HOSPADM

## 2022-11-19 RX ORDER — ONDANSETRON 2 MG/ML
4 INJECTION INTRAMUSCULAR; INTRAVENOUS ONCE
Status: COMPLETED | OUTPATIENT
Start: 2022-11-19 | End: 2022-11-19

## 2022-11-19 RX ADMIN — MORPHINE SULFATE 4 MG: 4 INJECTION, SOLUTION INTRAMUSCULAR; INTRAVENOUS at 17:03

## 2022-11-19 RX ADMIN — ONDANSETRON 4 MG: 2 INJECTION INTRAMUSCULAR; INTRAVENOUS at 15:46

## 2022-11-19 NOTE — ED PROVIDER NOTES
Subjective   History of Present Illness  Patient in the emergency department complaining of chest pain right shoulder pain.  She was seen back in October and had a negative MRI of her brain, cervical spine showed some mild degenerative disc disease at C5-7.  Shoulder of her right MRI showed some subacromial bursitis.  Was seen in 2 outlying emergency departments 4 days ago.  Notes reviewed and she had a negative CTA.  CBC CMP, TSH @115. Treated as pleurisy         History provided by:  Patient and medical records   used: No        Review of Systems   Constitutional: Positive for fatigue.   HENT: Negative for congestion.    Respiratory: Positive for shortness of breath.    Cardiovascular: Positive for chest pain. Negative for palpitations.   Gastrointestinal: Negative for abdominal pain, diarrhea, nausea and vomiting.   Genitourinary: Negative for flank pain.   Musculoskeletal: Positive for arthralgias and myalgias.   Allergic/Immunologic: Negative.    Neurological: Negative for weakness.   Hematological: Negative for adenopathy.   Psychiatric/Behavioral: Negative for confusion.   All other systems reviewed and are negative.      Past Medical History:   Diagnosis Date   • Acanthosis nigricans 2018   • Hirsutism 2018   • Hypothyroidism due to Hashimoto's thyroiditis 2018       Allergies   Allergen Reactions   • Erythromycin Rash   • Septra [Sulfamethoxazole-Trimethoprim] Rash       Past Surgical History:   Procedure Laterality Date   •  SECTION         Family History   Problem Relation Age of Onset   • Diabetes Mother    • No Known Problems Father    • Autism Son        Social History     Socioeconomic History   • Marital status: Single   Tobacco Use   • Smoking status: Never   • Smokeless tobacco: Never   Substance and Sexual Activity   • Alcohol use: No   • Drug use: No   • Sexual activity: Defer           Objective   Physical Exam  Vitals and nursing note reviewed.    Constitutional:       Appearance: She is well-developed.   HENT:      Head: Normocephalic.      Nose: Nose normal.   Eyes:      Conjunctiva/sclera: Conjunctivae normal.      Pupils: Pupils are equal, round, and reactive to light.   Cardiovascular:      Rate and Rhythm: Normal rate and regular rhythm.      Heart sounds: Normal heart sounds.   Pulmonary:      Effort: Pulmonary effort is normal.      Breath sounds: Normal breath sounds.   Abdominal:      Palpations: Abdomen is soft.   Musculoskeletal:      Right shoulder: Tenderness present.        Arms:       Cervical back: Normal range of motion.      Comments: With paraesthesias    Skin:     General: Skin is warm and dry.   Neurological:      Mental Status: She is alert and oriented to person, place, and time.      GCS: GCS eye subscore is 4. GCS verbal subscore is 5. GCS motor subscore is 6.         ECG 12 Lead      Date/Time: 11/19/2022 3:14 PM  Performed by: Pipo Gomez APRN  Authorized by: Janny Villafana MD   Interpreted by physician  Comparison: not compared with previous ECG   Rhythm: sinus rhythm  Rate: normal  QRS axis: normal  Conduction: conduction normal  ST Segments: ST segments normal  Other: no other findings  Clinical impression: normal ECG                 ED Course            CT Head Without Contrast   Final Result   1.  No acute intracranial process.      Electronically signed by:  Mauro Lake MD  11/19/2022 6:38 PM CST   Workstation: [id number]        Results for orders placed or performed during the hospital encounter of 11/19/22   Troponin    Specimen: Blood   Result Value Ref Range    Troponin T <0.010 0.000 - 0.030 ng/mL   Comprehensive Metabolic Panel    Specimen: Blood   Result Value Ref Range    Glucose 92 65 - 99 mg/dL    BUN 12 6 - 20 mg/dL    Creatinine 0.89 0.57 - 1.00 mg/dL    Sodium 140 136 - 145 mmol/L    Potassium 3.4 (L) 3.5 - 5.2 mmol/L    Chloride 102 98 - 107 mmol/L    CO2 26.0 22.0 - 29.0 mmol/L    Calcium 9.7 8.6  - 10.5 mg/dL    Total Protein 7.7 6.0 - 8.5 g/dL    Albumin 4.50 3.50 - 5.20 g/dL    ALT (SGPT) 26 1 - 33 U/L    AST (SGOT) 26 1 - 32 U/L    Alkaline Phosphatase 93 39 - 117 U/L    Total Bilirubin 0.3 0.0 - 1.2 mg/dL    Globulin 3.2 gm/dL    A/G Ratio 1.4 g/dL    BUN/Creatinine Ratio 13.5 7.0 - 25.0    Anion Gap 12.0 5.0 - 15.0 mmol/L    eGFR 87.9 >60.0 mL/min/1.73   BNP    Specimen: Blood   Result Value Ref Range    proBNP 7.5 0.0 - 450.0 pg/mL   CBC Auto Differential    Specimen: Blood   Result Value Ref Range    WBC 12.75 (H) 3.40 - 10.80 10*3/mm3    RBC 5.14 3.77 - 5.28 10*6/mm3    Hemoglobin 15.2 12.0 - 15.9 g/dL    Hematocrit 45.1 34.0 - 46.6 %    MCV 87.7 79.0 - 97.0 fL    MCH 29.6 26.6 - 33.0 pg    MCHC 33.7 31.5 - 35.7 g/dL    RDW 14.4 12.3 - 15.4 %    RDW-SD 46.3 37.0 - 54.0 fl    MPV 8.6 6.0 - 12.0 fL    Platelets 261 140 - 450 10*3/mm3    Neutrophil % 73.8 42.7 - 76.0 %    Lymphocyte % 18.7 (L) 19.6 - 45.3 %    Monocyte % 6.0 5.0 - 12.0 %    Eosinophil % 0.2 (L) 0.3 - 6.2 %    Basophil % 0.3 0.0 - 1.5 %    Immature Grans % 1.0 (H) 0.0 - 0.5 %    Neutrophils, Absolute 9.41 (H) 1.70 - 7.00 10*3/mm3    Lymphocytes, Absolute 2.38 0.70 - 3.10 10*3/mm3    Monocytes, Absolute 0.77 0.10 - 0.90 10*3/mm3    Eosinophils, Absolute 0.02 0.00 - 0.40 10*3/mm3    Basophils, Absolute 0.04 0.00 - 0.20 10*3/mm3    Immature Grans, Absolute 0.13 (H) 0.00 - 0.05 10*3/mm3    nRBC 0.0 0.0 - 0.2 /100 WBC   ECG 12 Lead Chest Pain   Result Value Ref Range    QT Interval 358 ms    QTC Interval 428 ms   Green Top (Gel)   Result Value Ref Range    Extra Tube Hold for add-ons.    Lavender Top   Result Value Ref Range    Extra Tube hold for add-on    Gold Top - SST   Result Value Ref Range    Extra Tube Hold for add-ons.    Light Blue Top   Result Value Ref Range    Extra Tube Hold for add-ons.                                        MDM  Number of Diagnoses or Management Options  Chronic right shoulder pain: new and requires  workup  Paresthesias: new and requires workup     Amount and/or Complexity of Data Reviewed  Clinical lab tests: reviewed and ordered  Tests in the radiology section of CPT®: reviewed and ordered  Tests in the medicine section of CPT®: reviewed and ordered  Obtain history from someone other than the patient: yes  Review and summarize past medical records: yes  Independent visualization of images, tracings, or specimens: yes    Risk of Complications, Morbidity, and/or Mortality  Presenting problems: moderate  Diagnostic procedures: moderate  Management options: moderate    Patient Progress  Patient progress: stable      Final diagnoses:   Chronic right shoulder pain   Paresthesias       ED Disposition  ED Disposition     ED Disposition   Discharge    Condition   Stable    Comment   --             Jesusita Otoole, APRN  [address] [facility] [id number] [phone]    Call in 1 week           Medication List      New Prescriptions    gabapentin 300 MG capsule  Commonly known as: NEURONTIN  Take 1 capsule by mouth 2 (Two) Times a Day for 10 days.           Where to Get Your Medications      These medications were sent to [id number] - [place] - [address] AT [facility] [phone] [facility] [phone]   [address] , [facility] [address]    Phone: [phone]   · gabapentin 300 MG capsule          Pipo Gomez, APRN  11/19/22 1943       Pipo Gomez, SANDRA  11/19/22 1944

## 2022-11-20 LAB
QT INTERVAL: 358 MS
QTC INTERVAL: 428 MS

## 2022-11-28 ENCOUNTER — HOSPITAL ENCOUNTER (OUTPATIENT)
Dept: PAIN MANAGEMENT | Age: 32
Discharge: HOME | End: 2022-11-28
Payer: MEDICAID

## 2022-11-28 VITALS
OXYGEN SATURATION: 99 % | DIASTOLIC BLOOD PRESSURE: 80 MMHG | TEMPERATURE: 97.3 F | RESPIRATION RATE: 18 BRPM | HEART RATE: 81 BPM | SYSTOLIC BLOOD PRESSURE: 126 MMHG

## 2022-11-28 PROCEDURE — 2500000003 HC RX 250 WO HCPCS

## 2022-11-28 PROCEDURE — 64405 NJX AA&/STRD GR OCPL NRV: CPT | Performed by: PSYCHIATRY & NEUROLOGY

## 2022-11-28 PROCEDURE — 64405 NJX AA&/STRD GR OCPL NRV: CPT

## 2022-11-28 RX ORDER — BUPIVACAINE HYDROCHLORIDE 2.5 MG/ML
5 INJECTION, SOLUTION EPIDURAL; INFILTRATION; INTRACAUDAL ONCE
Status: DISCONTINUED | OUTPATIENT
Start: 2022-11-28 | End: 2022-11-30 | Stop reason: HOSPADM

## 2022-11-28 NOTE — PROGRESS NOTES
Procedure:  Level of Consciousness: [x]Alert [x]Oriented []Disoriented []Lethargic  Anxiety Level: []Calm [x]Anxious []Depressed []Other  Skin: [x]Warm [x]Dry []Cool []Moist []Intact []Other  Cardiovascular: [x]Palpitations: [x]Never []Occasionally []Frequently  Chest Pain: [x]No []Yes  Respiratory:  [x]Unlabored []Labored []Cough ([] Productive []Unproductive)  HCG Required: [x]No []Yes   Results: []Negative []Positive  Knowledge Level:        [x]Patient/Other verbalized understanding of pre-procedure instructions. [x]Assessment of post-op care needs (transportation, responsible caregiver)        [x]Able to discuss health care problems and how to deal with it.   Factors that Affect Teaching:        Language Barrier: [x]No []Yes - why:        Hearing Loss:        [x]No []Yes            Corrective Device:  []Yes [x]No        Vision Loss:           []No [x]Yes            Corrective Device:  [x]Yes []No        Memory Loss:       [x]No []Yes            []Short Term []Long Term  Motivational Level:  [x]Asks Questions                  []Extremely Anxious       [x]Seems Interested               []Seems Uninterested                  [x]Denies need for Education  Risk for Injury:  [x]Patient oriented to person, place and time  []History of frequent falls/loss of balance  Nutritional:  []Change in appetite   []Weight Gain   []Weight Loss  Functional:  []Requires assistance with ADL's

## 2022-11-28 NOTE — DISCHARGE INSTRUCTIONS
[facility] Physical And Pain Medicine  Post Procedure Discharge Instructions        YOU HAVE HAD THE FOLLOWING PROCEDURE:                                  [x] Occipital Nerve Blocks  [] CTS wrist injection(s)  [] Knee Injection(s)         [] Shoulder Injection(s)   [] Elbow Injection(s)     [] Botox Injection  [] Cervical Trigger Point Injections    [] Thoracic Trigger Point Injections    [] Lumbar Trigger Point Injections  [] Piriformis Trigger Point Injections  [] SI Joint Injection(s)     [] Trochanteric Bursa Injection(s)       [] Ankle Injection(s)   [] Plantar Fasciitis   []  ______________  Injection(s) [] Botox []  Migraines [] Spasticity    YOU HAVE RECEIVED THE FOLLOWING MEDICATIONS IN YOUR INJECTION(s)  [] Lidocaine [x] Bupivacaine   [] DepoMedrol (steroid) [] Decadron (steroid)  []  Kenalog (steroid)   [] Toradol  [] Supartz [] Norlin Bienenstock    [] Botox        PATIENT INFORMATION:   You may experience the following symptoms after your procedure. These symptoms are normal and should not cause concern: You may have an increase in your pain. This may last 24 - 48 hours after your procedure. You may have no change in the pain that you had prior to your injection(s). You may have weakness or numbness in your affected extremity. If this occurs, this may last until numbing the medication wears off. REPORT THE FOLLOWING SYMPTOMS TO YOUR DOCTOR:  Redness, swelling or drainage at the injection site(s)  Unusual pain that interferes with your normal activities of daily living. OTHER INSTRUCTIONS:    [x] I will apply ice to the injection site(s) for at least 24 hours after the procedure. I will rotate the ice on for 20 minutes and off for 20 minutes for at least 24 hours. [x] I will not apply heat for at least 48 hours and I will not take a hot bath or shower for at least 24 hours.      [x] I understand that if Lidocaine or Bupivacaine was used in my injection(s) that the injection site(s) will be numb for a few hours after the procedure    [x] I understand if a steroid was used it will take effect in 3 - 7 days. I understand that as the numbing medication wears off, the pain may return until the steroids start working. [x] I understand that today I will rest for the next 24 hours and drink plenty of water. [] For Botox for Migraines please do not wear anything constricting around the forehead for 7-10 days post injection. Examples headband, hats, or bandana    [] For Botox for Spasticity start therapy for the affected limb in two weeks. [] Additional instructions: ______________________________________________ ___________________________________________________________________    Sedation:  Was oral sedation given? [] Yes  [x] No    I understand that if I took an oral nerve calming medication I will not drive for  [] 24 hours after taking the medication.     [x] I have received a copy of my discharge instructions and understand the above instructions to the best of my knowledge    Patient Discharged:  [x] Home  [] Hospital  [] Other  ____________________________________________    Via:  [x] Ambulatory  [] Wheelchair   [] Stretcher [] EMS       Accompanied By:   [] Significant other  [x] Family Member  [] Friend   [] Hospital Staff  []  Ambulance Staff  [] Other_______________________________________________    Plan:  [x] Will return to the office in   [] 1 month   [] 3 months for:  [] Procedure Follow-up  [x] Office Visit   [] Planned Procedure      Patient Signature: _____________________________________________________    Staff Signature: _______________________________________________________        If you have questions, problems or concerns you may call [phone] and follow the prompts    Dr. Isaiah Potter

## 2022-12-05 ENCOUNTER — TELEPHONE (OUTPATIENT)
Dept: NEUROLOGY | Age: 32
End: 2022-12-05

## 2022-12-05 NOTE — TELEPHONE ENCOUNTER
Patient called in stating the injection she received through pain mgmt is not working.   Patient wanting to see what further steps to take  A good call back time is anytime  Thank you

## 2022-12-05 NOTE — TELEPHONE ENCOUNTER
Me  to Amelia Maira MD DR    12:15 PM  The patient stated she is already taking Tegretol and has tried Gabapentin before and it didn't help but she is willing to try a different medication instead of a new Pain Management at this time. The patient called back and stated that she would like to know if there are any other tests that can be done?

## 2022-12-05 NOTE — TELEPHONE ENCOUNTER
Me  to Alicia Arzate MD DR    12:15 PM  The patient stated she is already taking Tegretol and has tried Gabapentin before and it didn't help but she is willing to try a different medication instead of a new Pain Management at this time.          The patient called back and stated that she would like to know if there are any other tests that can be comp

## 2022-12-05 NOTE — TELEPHONE ENCOUNTER
Does she want us to refer to pain management for different types of injections or is she interested in trying medicines

## 2022-12-06 RX ORDER — DULOXETIN HYDROCHLORIDE 30 MG/1
30 CAPSULE, DELAYED RELEASE ORAL DAILY
Qty: 30 CAPSULE | Refills: 3 | Status: SHIPPED | OUTPATIENT
Start: 2022-12-06

## 2022-12-06 NOTE — TELEPHONE ENCOUNTER
Called and spoke with pt to let her know Dr Patience Coulter sent in [address].  Pt voiced understanding

## 2022-12-27 ENCOUNTER — TELEPHONE (OUTPATIENT)
Dept: NEUROSURGERY | Age: 32
End: 2022-12-27

## 2023-01-19 ENCOUNTER — OFFICE VISIT (OUTPATIENT)
Dept: NEUROSURGERY | Age: 33
End: 2023-01-19
Payer: MEDICAID

## 2023-01-19 VITALS
SYSTOLIC BLOOD PRESSURE: 118 MMHG | BODY MASS INDEX: 47.09 KG/M2 | HEIGHT: 66 IN | WEIGHT: 293 LBS | HEART RATE: 74 BPM | DIASTOLIC BLOOD PRESSURE: 82 MMHG

## 2023-01-19 DIAGNOSIS — R20.0 RIGHT ARM NUMBNESS: Primary | ICD-10-CM

## 2023-01-19 DIAGNOSIS — M79.601 RIGHT ARM PAIN: ICD-10-CM

## 2023-01-19 DIAGNOSIS — R20.0 RIGHT FACIAL NUMBNESS: ICD-10-CM

## 2023-01-19 PROCEDURE — G8417 CALC BMI ABV UP PARAM F/U: HCPCS | Performed by: NEUROLOGICAL SURGERY

## 2023-01-19 PROCEDURE — 3079F DIAST BP 80-89 MM HG: CPT | Performed by: NEUROLOGICAL SURGERY

## 2023-01-19 PROCEDURE — 99204 OFFICE O/P NEW MOD 45 MIN: CPT | Performed by: NEUROLOGICAL SURGERY

## 2023-01-19 PROCEDURE — G8427 DOCREV CUR MEDS BY ELIG CLIN: HCPCS | Performed by: NEUROLOGICAL SURGERY

## 2023-01-19 PROCEDURE — 1036F TOBACCO NON-USER: CPT | Performed by: NEUROLOGICAL SURGERY

## 2023-01-19 PROCEDURE — 3074F SYST BP LT 130 MM HG: CPT | Performed by: NEUROLOGICAL SURGERY

## 2023-01-19 PROCEDURE — G8484 FLU IMMUNIZE NO ADMIN: HCPCS | Performed by: NEUROLOGICAL SURGERY

## 2023-01-19 RX ORDER — SPIRONOLACTONE 50 MG/1
TABLET, FILM COATED ORAL
COMMUNITY
Start: 2022-12-12

## 2023-01-19 RX ORDER — LOSARTAN POTASSIUM 25 MG/1
TABLET ORAL
COMMUNITY
Start: 2023-01-02

## 2023-01-19 RX ORDER — CYCLOBENZAPRINE HCL 10 MG
TABLET ORAL
COMMUNITY
Start: 2023-01-02

## 2023-01-19 RX ORDER — CLONIDINE HYDROCHLORIDE 0.1 MG/1
TABLET ORAL
COMMUNITY
Start: 2023-01-02

## 2023-01-19 RX ORDER — METOPROLOL SUCCINATE 50 MG/1
TABLET, EXTENDED RELEASE ORAL
COMMUNITY

## 2023-01-19 ASSESSMENT — ENCOUNTER SYMPTOMS
GASTROINTESTINAL NEGATIVE: 1
EYES NEGATIVE: 1
RESPIRATORY NEGATIVE: 1

## 2023-01-19 NOTE — PROGRESS NOTES
Review of Systems   Constitutional: Negative. HENT: Negative. Eyes: Negative. Respiratory: Negative. Cardiovascular: Negative. Gastrointestinal: Negative. Genitourinary: Negative. Musculoskeletal:  Positive for joint pain and neck pain. Skin: Negative. Neurological:  Positive for tingling and headaches. Endo/Heme/Allergies: Negative. Psychiatric/Behavioral: Negative.

## 2023-01-19 NOTE — PROGRESS NOTES
[facility] Neurosurgery  Office Visit      Chief Complaint   Patient presents with    Consultation     New patient establishing care. Results     Review MRI cervical spine completed 10/10/2022. Neck Pain     Patient is c/o neck pain since a 5ft fall from her porch that resulted in her landing on her head/neck on the concrete. This accident occurred during July 2022. Patient was evaluated by pain management and received one set of injections which were not helpful, patient states that she declined further injections due to ineffectiveness and was told \"then I'm done with you\" and she has not returned to pain mgmt since. Patient has not undergone any PT for this issue. Numbness     Patient c/o RUE numbness/tingling. Arm Pain     Patient c/o RUE pain that has been ongoing since her July 2022 fall. Headache     Patient notes almost daily headaches that occur strictly on the RT side of her head, she states this has also been since the fall in July. Patient denies problems with any headaches or \"head pain\" prior to this incident. HISTORY OF PRESENT ILLNESS:    Kelly Cuello is a 34 y.o. female who presents with neck and RUE pain. She states that she fell off a porch in July 2022 and has had pain since then. She complains of right sided head pain, right shoulder pain, and into the chest.  The pain does radiate into RIGHT shoulder, bicep, forearm, and 4th and 5th digits. Her pain is mostly located in the RUE. The patient complains of numbness of the right face when the pain becomes severe. She does not have numbness in the fingertips, trouble using hands to perform fine motor tasks. Does drop some objects. Feels off balance when walking and states she was told this was related to her lumbar spine. She states she received a cervical and occipital nerve block per Dr. Bertha means in November 2022 that helped for about 3 days.          The patient has underwent a non-operative treatment course that has included:  NSAIDs  Muscle Relaxers (flexeril)        Of note she does not use tobacco and does not take blood thinning medications. Past Medical History:   Diagnosis Date    Anemia     Hypertension     Hypothyroidism     Vitamin D deficiency        Past Surgical History:   Procedure Laterality Date     SECTION      CHOLECYSTECTOMY         Current Outpatient Medications   Medication Sig Dispense Refill    cloNIDine (CATAPRES) 0.1 MG tablet TAKE 1 TABLET BY MOUTH EVERY 12 HOURS AS NEEDED      losartan (COZAAR) 25 MG tablet TAKE 1 TABLET BY MOUTH EVERY DAY      metoprolol succinate (TOPROL XL) 50 MG extended release tablet metoprolol succinate ER 50 mg tablet,extended release 24 hr   Take 1 tablet every day by oral route for 90 days. cyclobenzaprine (FLEXERIL) 10 MG tablet TAKE 1 TABLET BY MOUTH 8H AS NEEDED MUSCLE RELAXANT      spironolactone (ALDACTONE) 50 MG tablet TAKE 1 TABLET BY MOUTH EVERY DAY      DULoxetine (CYMBALTA) 30 MG extended release capsule Take 1 capsule by mouth daily 30 capsule 3    carBAMazepine (TEGRETOL) 200 MG tablet Take 200 mg by mouth 3 times daily      escitalopram (LEXAPRO) 20 MG tablet Take 20 mg by mouth daily      iron polysaccharides (NIFEREX) 150 MG capsule Take 150 mg by mouth      potassium chloride (KLOR-CON M) 20 MEQ extended release tablet       metFORMIN (GLUCOPHAGE) 500 MG tablet Take 500 mg by mouth      levothyroxine (SYNTHROID) 200 MCG tablet 200 mcg      SYNTHROID 300 MCG tablet       hydrochlorothiazide (HYDRODIURIL) 25 MG tablet       vitamin D (ERGOCALCIFEROL) 93115 units CAPS capsule        No current facility-administered medications for this visit.        Allergies:  Erythromycin and Sulfamethoxazole-trimethoprim    Social History:   Social History     Tobacco Use   Smoking Status Never   Smokeless Tobacco Never     Social History     Substance and Sexual Activity   Alcohol Use Never         Family History:   No family history on file. REVIEW OF SYSTEMS:  Constitutional: Negative. HENT: Negative. Eyes: Negative. Respiratory: Negative. Cardiovascular: Negative. Gastrointestinal: Negative. Genitourinary: Negative. Musculoskeletal:  Positive for joint pain and neck pain. Skin: Negative. Neurological:  Positive for tingling and headaches. Endo/Heme/Allergies: Negative. Psychiatric/Behavioral: Negative. PHYSICAL EXAM:  Vitals:    01/19/23 1411   BP: 118/82   Pulse: 74     Constitutional: appears well-developed and well-nourished. Eyes - conjunctiva normal.  Pupils react to light  Ear, nose, throat - hearing intact to finger rub, No scars, masses, or lesions over external nose or ears, no atrophy oftongue  Neck- symmetric, no masses noted, no jugular vein distension  Respiration- chest wall appears symmetric, good expansion, normal effort without use of accessory muscles  Musculoskeletal - no significant wasting of muscles noted, no bony deformities, gait no gross ataxia  Extremities- no clubbing, cyanosis oredema  Skin - warm, dry, and intact. No rash, erythema, or pallor. Psychiatric - mood, affect, and behavior appear normal.     Neurologic Examination  Awake, Alert and oriented x 4  Normal speech pattern, following commands    Motor:  RIGHT: hand grasp 5/5    finger extension 5/5    bicep 5/5    triceps 5/5    deltoid 5/5      iliopsoas 5/5    knee flexor 5/5    knee extension 5/5    EHL 5/5   dorsiflexion 5/5    plantar flexion 5/5    LEFT:   hand grasp 5/5    finger extension 5/5    bicep 5/5    triceps 5/5    deltoid 5/5      iliopsoas 5/5    knee flexor 5/5    knee extension 5/5    EHL 5/5   dorsiflexion 5/5    plantar flexion 5/5    No deficits to light touch or pinprick sensation  Reflexes are 2+ and symmetric  No clonus or Hoffmans sign  No myofacial tenderness to palpation  Normal Gait pattern        DATA and IMAGING:    Nursing/pcp notes, imaging, labs, and vitals reviewed.      PT,OT and/or speech notes reviewed    Lab Results   Component Value Date    WBC 9.7 11/15/2022    HGB 15.0 11/15/2022    HCT 46.5 11/15/2022    MCV 91.0 11/15/2022     11/15/2022     Lab Results   Component Value Date     11/15/2022    K 3.8 11/15/2022     11/15/2022    CO2 27 11/15/2022    BUN 12 11/15/2022    CREATININE 1.0 (H) 11/15/2022    GLUCOSE 112 (H) 11/15/2022    CALCIUM 9.6 11/15/2022    PROT 7.7 11/15/2022    LABALBU 4.5 11/15/2022    BILITOT 0.3 11/15/2022    ALKPHOS 104 11/15/2022    AST 32 11/15/2022    ALT 29 11/15/2022    LABGLOM >60 11/15/2022    GFRAA >59 08/04/2022   No results found for: INR, PROTIME      NO PRIOR REPORT AVAILABLE   Exam: MRI OF THE CERVICAL SPINE WITHOUT CONTRAST   Clinical data:  Neck pain. Technique: Multiplanar multisequence MRI of the cervical spine without contrast. Axial imaging is performed throughout the cervical spine. Prior studies: No prior studies submitted. Findings: Imaged posterior fossa is unremarkable. Craniocervical junction is intact. There is loss of normal cervical lordosis without acute fracture or subluxation. Vertebral body and intervertebral disc heights are maintained. There is normal marrow    signal of the vertebrae. Cervical cord is in anatomic location. No abnormal signal involves the cord. Surrounding soft tissues are unremarkable. Level by level disease is present as follows:       C1-C2: No significant osteoarthritis. C2-C3: There is no abnormally positioned disc material. There is no significant spinal canal, lateral recess, neural foramina compromise, or nerve impingement. C3-C4: There is no abnormally positioned disc material. There is no significant spinal canal, lateral recess, neural foramina compromise, or nerve impingement. C4-C5: There is no abnormally positioned disc material. There is no significant spinal canal, lateral recess, neural foramina compromise, or nerve impingement. C5-C6: Disc desiccation with mild circumferential disc bulge 1 mm. There is no significant spinal canal, lateral recess, neural foramina compromise, or nerve impingement. C6-C7: Disc desiccation with circumferential disc bulge 2 mm. Partial effacement of the ventral CSF space. There is no significant lateral recess, neural foramina compromise, or nerve impingement. C7-T1: There is no abnormally positioned disc material. There is no significant spinal canal, lateral recess, neural foramina compromise, or nerve impingement. Impression       1. Mild degenerative disc disease at the C5-6 and C6-7 levels. 2.  Loss of normal cervical lordosis, likely related to muscular spasm. Recommendation:    Follow up as clinically indicated. Electronically Signed by India Machado MD at 12-Oct-2022 01:13:46 AM         I have personally reviewed these images and my interpretation is:  DDD C5 to C7  C6-7 there is a mild disc herniation with mild foraminal stenosis      ASSESSMENT:    Emanuel Kumar is a 34 y.o. female with a fall off of her porch July 2022 with complaints of righ sided facial numbness, RUE pain. ICD-10-CM    1. Right arm numbness  R20.0 Nerve Conduction Test with EMG      2. Right facial numbness  R20.0 Nerve Conduction Test with EMG      3. Right arm pain  M79.601 Nerve Conduction Test with EMG          PLAN:  We have discussed and reviewed the results of the MRI cervical spine with MsGill Danielle at length.   We explained that she does have a small disc herniation   -Start PT (Castro)   -Obtain NCS  -Follow up as needed; will call with NCS results         Teddy Dickinson DO

## 2023-02-17 ENCOUNTER — TELEPHONE (OUTPATIENT)
Dept: BARIATRICS/WEIGHT MGMT | Facility: CLINIC | Age: 33
End: 2023-02-17
Payer: COMMERCIAL

## 2023-02-17 NOTE — TELEPHONE ENCOUNTER
LVM to remind them of their new patient appointment, to bring completed paperwork, sign up for [id number]. Please arrive 60 minutes early if these aren't completed. Please call back with any questions [phone]. Also this 1st appt may last up to 2 hrs due to meeting with 2 different providers.

## 2023-04-26 RX ORDER — DULOXETIN HYDROCHLORIDE 30 MG/1
CAPSULE, DELAYED RELEASE ORAL
Qty: 90 CAPSULE | Refills: 0 | Status: SHIPPED | OUTPATIENT
Start: 2023-04-26

## 2023-08-07 ENCOUNTER — HOSPITAL ENCOUNTER (OUTPATIENT)
Dept: PAIN MANAGEMENT | Age: 33
Discharge: HOME | End: 2023-08-07
Payer: MEDICAID

## 2023-08-07 VITALS
RESPIRATION RATE: 18 BRPM | DIASTOLIC BLOOD PRESSURE: 94 MMHG | OXYGEN SATURATION: 98 % | SYSTOLIC BLOOD PRESSURE: 128 MMHG | HEART RATE: 89 BPM | TEMPERATURE: 97 F

## 2023-08-07 PROCEDURE — 64405 NJX AA&/STRD GR OCPL NRV: CPT

## 2023-08-07 PROCEDURE — 64405 NJX AA&/STRD GR OCPL NRV: CPT | Performed by: PSYCHIATRY & NEUROLOGY

## 2023-08-07 PROCEDURE — 6360000002 HC RX W HCPCS

## 2023-08-07 RX ORDER — THYROID, PORCINE 120 MG/1
120 TABLET ORAL DAILY
COMMUNITY
Start: 2023-07-21

## 2023-08-07 RX ORDER — ETHYL CHLORIDE 100 %
AEROSOL, SPRAY (ML) TOPICAL PRN
Status: DISCONTINUED | OUTPATIENT
Start: 2023-08-07 | End: 2023-08-09 | Stop reason: HOSPADM

## 2023-08-07 RX ORDER — BUPIVACAINE HYDROCHLORIDE 2.5 MG/ML
5 INJECTION, SOLUTION EPIDURAL; INFILTRATION; INTRACAUDAL ONCE
Status: DISCONTINUED | OUTPATIENT
Start: 2023-08-07 | End: 2023-08-09 | Stop reason: HOSPADM

## 2023-08-07 NOTE — DISCHARGE INSTRUCTIONS
[address] [facility]  Post Procedure Discharge Instructions        YOU HAVE HAD THE FOLLOWING PROCEDURE:                                  [x] Occipital Nerve Blocks  [] CTS wrist injection(s)  [] Knee Injection(s)         [] Shoulder Injection(s)   [] Elbow Injection(s)     [] Botox Injection  [] Cervical Trigger Point Injections    [] Thoracic Trigger Point Injections    [] Lumbar Trigger Point Injections  [] Piriformis Trigger Point Injections  [] SI Joint Injection(s)     [] Trochanteric Bursa Injection(s)       [] Ankle Injection(s)   [] Plantar Fasciitis   []  ______________  Injection(s) [] Botox []  Migraines [] Spasticity    YOU HAVE RECEIVED THE FOLLOWING MEDICATIONS IN YOUR INJECTION(s)  [] Lidocaine [x] Bupivacaine   [] DepoMedrol (steroid) [] Decadron (steroid)  []  Kenalog (steroid)   [] Toradol  [] Supartz [] Deborrah Piles    [] Botox        PATIENT INFORMATION:   You may experience the following symptoms after your procedure. These symptoms are normal and should not cause concern: You may have an increase in your pain. This may last 24 - 48 hours after your procedure. You may have no change in the pain that you had prior to your injection(s). You may have weakness or numbness in your affected extremity. If this occurs, this may last until numbing the medication wears off. REPORT THE FOLLOWING SYMPTOMS TO YOUR DOCTOR:  Redness, swelling or drainage at the injection site(s)  Unusual pain that interferes with your normal activities of daily living. OTHER INSTRUCTIONS:    [x] I will apply ice to the injection site(s) for at least 24 hours after the procedure. I will rotate the ice on for 20 minutes and off for 20 minutes for at least 24 hours. [x] I will not apply heat for at least 48 hours and I will not take a hot bath or shower for at least 24 hours.      [x] I understand that if Lidocaine or Bupivacaine was used in my injection(s) that the injection site(s)

## 2023-08-07 NOTE — PROGRESS NOTES
Procedure:  Level of Consciousness: [x]Alert [x]Oriented []Disoriented []Lethargic  Anxiety Level: [x]Calm []Anxious []Depressed []Other  Skin: [x]Warm [x]Dry []Cool []Moist []Intact []Other  Cardiovascular: [x]Palpitations: [x]Never []Occasionally []Frequently  Chest Pain: [x]No []Yes  Respiratory:  [x]Unlabored []Labored []Cough ([] Productive []Unproductive)  HCG Required: [x]No []Yes   Results: []Negative []Positive  Knowledge Level:        [x]Patient/Other verbalized understanding of pre-procedure instructions. [x]Assessment of post-op care needs (transportation, responsible caregiver)        [x]Able to discuss health care problems and how to deal with it.   Factors that Affect Teaching:        Language Barrier: [x]No []Yes - why:        Hearing Loss:        [x]No []Yes            Corrective Device:  []Yes [x]No        Vision Loss:           [x]No []Yes            Corrective Device:  []Yes [x]No        Memory Loss:       []No []Yes            []Short Term []Long Term  Motivational Level:  [x]Asks Questions                  []Extremely Anxious       [x]Seems Interested               []Seems Uninterested                  [x]Denies need for Education  Risk for Injury:  [x]Patient oriented to person, place and time  []History of frequent falls/loss of balance  Nutritional:  []Change in appetite   []Weight Gain   []Weight Loss  Functional:  []Requires assistance with ADL's

## 2023-09-05 RX ORDER — DULOXETIN HYDROCHLORIDE 30 MG/1
CAPSULE, DELAYED RELEASE ORAL
Qty: 90 CAPSULE | Refills: 0 | Status: SHIPPED | OUTPATIENT
Start: 2023-09-05

## 2023-09-05 NOTE — TELEPHONE ENCOUNTER
Requested Prescriptions     Pending Prescriptions Disp Refills    DULoxetine (CYMBALTA) 30 MG extended release capsule [Pharmacy Med Name: DULOXETINE HCL DR 30 MG CAP] 90 capsule 0     Sig: TAKE 1 CAPSULE BY MOUTH EVERY DAY       Last Office Visit: 9/27/2022  Next Office Visit: Visit date not found  Last Medication Refill:  4/26/23    Nick

## 2024-11-06 ENCOUNTER — OFFICE VISIT (OUTPATIENT)
Age: 34
End: 2024-11-06

## 2024-11-06 ENCOUNTER — TRANSCRIBE ORDERS (OUTPATIENT)
Dept: ADMINISTRATIVE | Facility: HOSPITAL | Age: 34
End: 2024-11-06
Payer: COMMERCIAL

## 2024-11-06 VITALS — HEIGHT: 66 IN | BODY MASS INDEX: 47.09 KG/M2 | WEIGHT: 293 LBS

## 2024-11-06 DIAGNOSIS — M23.307 MENISCUS DEGENERATION, LEFT: Primary | ICD-10-CM

## 2024-11-06 DIAGNOSIS — S76.312A HAMSTRING STRAIN, LEFT, INITIAL ENCOUNTER: ICD-10-CM

## 2024-11-06 DIAGNOSIS — M25.562 LEFT KNEE PAIN, UNSPECIFIED CHRONICITY: Primary | ICD-10-CM

## 2024-11-06 DIAGNOSIS — M23.307 MENISCUS DEGENERATION, LEFT: ICD-10-CM

## 2024-11-06 ASSESSMENT — ENCOUNTER SYMPTOMS: SHORTNESS OF BREATH: 0

## 2024-11-06 NOTE — PROGRESS NOTES
[facility]  [facility]  [address] [place] [address]  Dept: [phone]  Dept Fax: [phone]  Loc: [phone]     Mary Danielle (:  1990) is a 34 y.o. female,Established patient, here for evaluation of the following:    Chief Complaint   Patient presents with    Knee Pain     Left ( fell & twisted knee)  DOI: 10/17           Subjective   Patient is a 34-year-old  female came to the clinic with left knee pain.  She reports she injured herself on 10/17/2024.  She reports she fell and twisted and fell onto her knee curled up underneath herself.  She has been using heat and propping her leg up.  She states that her pain is increased after those things.  She takes Tylenol and ibuprofen without significant relief.  She has not been using any ice.    Knee Pain   Pertinent negatives include no numbness.       Allergies   Allergen Reactions    Erythromycin Rash    Sulfamethoxazole-Trimethoprim Rash     Past Surgical History:   Procedure Laterality Date     SECTION      CHOLECYSTECTOMY       Social History     Tobacco Use    Smoking status: Never    Smokeless tobacco: Never   Vaping Use    Vaping status: Never Used   Substance Use Topics    Alcohol use: Never    Drug use: Never          Review of Systems   Constitutional:  Negative for fatigue and fever.   Respiratory:  Negative for shortness of breath.    Cardiovascular:  Negative for chest pain.   Musculoskeletal:  Positive for arthralgias and joint swelling. Negative for myalgias.   Skin:  Negative for rash and wound.   Neurological:  Negative for weakness and numbness.   Psychiatric/Behavioral:  Negative for agitation and confusion.           Objective   Physical Exam  Constitutional:       General: She is not in acute distress.     Appearance: Normal appearance.   HENT:      Head: Normocephalic.   Pulmonary:      Effort: Pulmonary effort is normal.   Musculoskeletal:

## 2024-12-11 ENCOUNTER — TELEPHONE (OUTPATIENT)
Age: 34
End: 2024-12-11

## 2024-12-11 NOTE — TELEPHONE ENCOUNTER
[facility] need her pre auth for her MRI on her lt knee faxed to [phone] pt has an appt on Monday they will need this sent before Friday

## 2024-12-16 ENCOUNTER — HOSPITAL ENCOUNTER (OUTPATIENT)
Dept: MRI IMAGING | Facility: HOSPITAL | Age: 34
Discharge: HOME | End: 2024-12-16
Admitting: PHYSICIAN ASSISTANT
Payer: COMMERCIAL

## 2024-12-16 DIAGNOSIS — S76.312A HAMSTRING STRAIN, LEFT, INITIAL ENCOUNTER: ICD-10-CM

## 2024-12-16 DIAGNOSIS — M23.307 MENISCUS DEGENERATION, LEFT: ICD-10-CM

## 2024-12-16 PROCEDURE — 73721 MRI JNT OF LWR EXTRE W/O DYE: CPT

## 2025-01-16 ENCOUNTER — OFFICE VISIT (OUTPATIENT)
Age: 35
End: 2025-01-16

## 2025-01-16 VITALS — HEIGHT: 66 IN | BODY MASS INDEX: 47.09 KG/M2 | WEIGHT: 293 LBS

## 2025-01-16 DIAGNOSIS — S83.529A PARTIAL TEAR OF POSTERIOR CRUCIATE LIGAMENT OF KNEE: Primary | ICD-10-CM

## 2025-01-16 DIAGNOSIS — Z71.2 PERSON CONSULTING FOR EXPLANATION OF EXAMINATION OR TEST FINDING: ICD-10-CM

## 2025-01-16 DIAGNOSIS — M23.307 MENISCUS DEGENERATION, LEFT: ICD-10-CM

## 2025-01-16 RX ORDER — LEVOTHYROXINE SODIUM 125 UG/1
125 TABLET ORAL DAILY
COMMUNITY
Start: 2025-01-04

## 2025-01-16 RX ORDER — CEFDINIR 300 MG/1
300 CAPSULE ORAL 2 TIMES DAILY
COMMUNITY
Start: 2025-01-11

## 2025-01-16 RX ORDER — LIOTHYRONINE SODIUM 25 UG/1
25 TABLET ORAL DAILY
COMMUNITY

## 2025-01-16 RX ORDER — OMEPRAZOLE 40 MG/1
CAPSULE, DELAYED RELEASE ORAL DAILY
COMMUNITY
Start: 2025-01-08

## 2025-01-16 RX ORDER — POTASSIUM CHLORIDE 1500 MG/1
20 TABLET, EXTENDED RELEASE ORAL DAILY
COMMUNITY
Start: 2024-10-21

## 2025-01-16 RX ORDER — KETOROLAC TROMETHAMINE 10 MG/1
10 TABLET, FILM COATED ORAL EVERY 8 HOURS PRN
COMMUNITY
Start: 2025-01-11

## 2025-01-16 ASSESSMENT — ENCOUNTER SYMPTOMS: SHORTNESS OF BREATH: 0

## 2025-01-16 NOTE — PROGRESS NOTES
[facility]  [facility]  [address] [place] [address]  Dept: [phone]  Dept Fax: [phone]  Loc: [phone]     Mary Danielle (:  1990) is a 34 y.o. female,Established patient, here for evaluation of the following:    Chief Complaint   Patient presents with    Knee Pain     L knee  DOI: 10/17/24           Subjective   Patient is a 34-year-old  female came to the clinic for a follow-up of her left knee.  She had an MRI at [facility].  She reports her pain is improving.  Injury occurred on 10/17/2024.  MRI shows marrow edema in the medial tibial plateau.  Chondral loss in the patella region.  And some muscular tendon tears in the hamstring tendons.        Allergies   Allergen Reactions    Erythromycin Rash    Sulfamethoxazole-Trimethoprim Rash     Past Surgical History:   Procedure Laterality Date     SECTION      CHOLECYSTECTOMY       Social History     Tobacco Use    Smoking status: Never    Smokeless tobacco: Never   Vaping Use    Vaping status: Never Used   Substance Use Topics    Alcohol use: Never    Drug use: Never          Review of Systems   Constitutional:  Negative for fatigue and fever.   Respiratory:  Negative for shortness of breath.    Cardiovascular:  Negative for chest pain.   Musculoskeletal:  Positive for arthralgias. Negative for joint swelling and myalgias.   Skin:  Negative for rash and wound.   Neurological:  Negative for weakness and numbness.   Psychiatric/Behavioral:  Negative for agitation and confusion.           Objective   Physical Exam  Constitutional:       General: She is not in acute distress.     Appearance: Normal appearance.   HENT:      Head: Normocephalic.   Pulmonary:      Effort: Pulmonary effort is normal.   Musculoskeletal:      Comments: Upon inspection of the left knee there is no erythema, ecchymosis, deformity.  Range of motion is within normal limits.

## 2025-03-03 ENCOUNTER — PROCEDURE VISIT (OUTPATIENT)
Dept: OTOLARYNGOLOGY | Facility: CLINIC | Age: 35
End: 2025-03-03
Payer: COMMERCIAL

## 2025-03-03 ENCOUNTER — OFFICE VISIT (OUTPATIENT)
Dept: OTOLARYNGOLOGY | Facility: CLINIC | Age: 35
End: 2025-03-03
Payer: COMMERCIAL

## 2025-03-03 ENCOUNTER — LAB (OUTPATIENT)
Dept: LAB | Facility: HOSPITAL | Age: 35
End: 2025-03-03
Payer: COMMERCIAL

## 2025-03-03 VITALS
HEIGHT: 65 IN | SYSTOLIC BLOOD PRESSURE: 93 MMHG | BODY MASS INDEX: 48.82 KG/M2 | WEIGHT: 293 LBS | OXYGEN SATURATION: 97 % | TEMPERATURE: 97.8 F | DIASTOLIC BLOOD PRESSURE: 68 MMHG | HEART RATE: 69 BPM

## 2025-03-03 DIAGNOSIS — H90.3 SENSORINEURAL HEARING LOSS, BILATERAL: ICD-10-CM

## 2025-03-03 DIAGNOSIS — L29.9 EAR ITCH: ICD-10-CM

## 2025-03-03 DIAGNOSIS — H92.02 OTALGIA OF LEFT EAR: Primary | ICD-10-CM

## 2025-03-03 DIAGNOSIS — H90.3 SENSORINEURAL HEARING LOSS, BILATERAL: Primary | ICD-10-CM

## 2025-03-03 DIAGNOSIS — H92.02 OTALGIA OF LEFT EAR: ICD-10-CM

## 2025-03-03 LAB
CHROMATIN AB SERPL-ACNC: <10 IU/ML (ref 0–14)
CRP SERPL-MCNC: 1.54 MG/DL (ref 0–0.5)
ERYTHROCYTE [SEDIMENTATION RATE] IN BLOOD: 26 MM/HR (ref 0–20)

## 2025-03-03 PROCEDURE — 92557 COMPREHENSIVE HEARING TEST: CPT

## 2025-03-03 PROCEDURE — 36415 COLL VENOUS BLD VENIPUNCTURE: CPT

## 2025-03-03 PROCEDURE — 86037 ANCA TITER EACH ANTIBODY: CPT

## 2025-03-03 PROCEDURE — 92567 TYMPANOMETRY: CPT

## 2025-03-03 PROCEDURE — 85652 RBC SED RATE AUTOMATED: CPT

## 2025-03-03 PROCEDURE — 99203 OFFICE O/P NEW LOW 30 MIN: CPT | Performed by: EMERGENCY MEDICINE

## 2025-03-03 PROCEDURE — 86140 C-REACTIVE PROTEIN: CPT

## 2025-03-03 PROCEDURE — 86431 RHEUMATOID FACTOR QUANT: CPT

## 2025-03-03 PROCEDURE — 1159F MED LIST DOCD IN RCRD: CPT | Performed by: EMERGENCY MEDICINE

## 2025-03-03 PROCEDURE — 1160F RVW MEDS BY RX/DR IN RCRD: CPT | Performed by: EMERGENCY MEDICINE

## 2025-03-03 RX ORDER — LANCETS 30 GAUGE
EACH MISCELLANEOUS
COMMUNITY
Start: 2025-02-24

## 2025-03-03 RX ORDER — BLOOD SUGAR DIAGNOSTIC
STRIP MISCELLANEOUS
COMMUNITY
Start: 2024-11-18

## 2025-03-03 RX ORDER — OMEPRAZOLE 40 MG/1
CAPSULE, DELAYED RELEASE ORAL DAILY
COMMUNITY
Start: 2025-01-08

## 2025-03-03 RX ORDER — LOSARTAN POTASSIUM 25 MG/1
1 TABLET ORAL DAILY
COMMUNITY
Start: 2025-02-25

## 2025-03-03 RX ORDER — METOPROLOL SUCCINATE 100 MG/1
TABLET, EXTENDED RELEASE ORAL
COMMUNITY

## 2025-03-03 RX ORDER — SUCRALFATE 1 G/1
TABLET ORAL
COMMUNITY

## 2025-03-03 RX ORDER — POTASSIUM CHLORIDE 1500 MG/1
20 TABLET, EXTENDED RELEASE ORAL DAILY
COMMUNITY

## 2025-03-03 RX ORDER — FLUTICASONE PROPIONATE 50 MCG
2 SPRAY, SUSPENSION (ML) NASAL DAILY
COMMUNITY
Start: 2025-01-08

## 2025-03-03 NOTE — PROGRESS NOTES
AUDIOMETRIC EVALUATION      Name:  Shawnee Benjamin  :  1990  Age:  34 y.o.  Date of Evaluation:  2025       History:  Ms. Benjamin is seen today for a hearing evaluation due to eustachian tube dysfunction and decreased hearing at the request of SANDRA Pickering.    Audiologic Information:  Concerns for Hearing: Left ear only   PETs: no  Other otologic surgical history: no  Aural Pressure/Fullness: Feels like crystals are moving around in left ear   Otalgia: Left ear feels tender in the canal and itches   Otorrhea: no  Tinnitus: no  Dizziness: no  Noise Exposure: no  Family history of hearing loss: no  Head trauma requiring hospital stay: no  Chemotherapy: no  Other significant history: Medicated high blood pressure    **Case history obtained in office and through EMR system      EVALUATION:        RESULTS:    Otoscopic Evaluation:  Right: clear canal, tympanic membrane visualized  Left: clear canal, tympanic membrane visualized    Tympanometry (226 Hz):  Right: Type A  Left: Type A    Pure Tone Audiometry:    Right: Mild recovering to normal by 3 kHz sensorineural hearing loss   Left: Mild recovering to normal by 6 kHz sensorineural hearing loss (conductive component at 1 kHz only)    Speech Audiometry:   Right: Speech Reception Threshold (SRT) was obtained at 35 dB HL  Word Recognition scores - Excellent (100)% at 75 dB with 55 dB of contralateral masking, using NU-6 List 1A with 10 words  Left: Speech Reception Threshold (SRT) was obtained at 40 dB HL  Word Recognition scores - Excellent (100)% at 80 dB with 60 dB of contralateral masking, using NU-6 List 2A with 10 words  SRT/PTA in good agreement bilaterally.    IMPRESSIONS:  Tympanometry showed normal middle ear pressure and static compliance, consistent with normal middle ear function for both ears.      Pure tone thresholds for both ears show a rising mild to normal sensorineural hearing loss, suggesting normal outer/middle ear  function and abnormal cochlear/retrocochlear function.     Patient was counseled with regard to the findings.    Amplification needs: Patient could benefit from amplifiers or hearing aids bilaterally.     Diagnosis:  1. Sensorineural hearing loss, bilateral         RECOMMENDATIONS/PLAN:  Follow-up recommendations per SANDRA Pickering.  Practice good communication strategies to assist with everyday listening (eye contact with speakers, reduce background noise, encourage others to communicate clearly and slowly).  Hearing aid evaluation and counseling upon medical clearance and patient motivation.   Repeat hearing evaluation if changes in hearing are noted or concerns arise.  Discussed results and recommendations with patient. Questions were addressed and the patient was encouraged to contact our department should concerns arise.        Roz Dove, CCC-A  Doctor of Audiology

## 2025-03-03 NOTE — PROGRESS NOTES
SANDRA Pickering  [facility] EAR NOSE & THROAT  [address], [address]  [place] [place] [facility] [address]  Fax [phone]  Phone [phone]      Visit Type: NEW PATIENT   Chief Complaint   Patient presents with    Establish Care    Hearing Loss    Recurrent Otitis     Patient recalls since November 2024 she has been having a persistent ear infections, managed with antibiotics, ear drops and eye drops which didn't seem to help     Earache     Left,            HPI      History of Present Illness  The patient is a 34-year-old female who presents as a new patient with ear complaints.    She has been experiencing persistent itching in her left ear since 11/2024, accompanied by a sensation akin to constant blowing. Occasionally, she perceives a sound similar to rocks moving within her ear when her head is positioned in a certain way. The internal swelling of the ear remains red and itchy. She reports no issues with her right ear. Insertion of any object into her ear during hearing tests induces itching and pain. The redness in her ear has been present since 11/2024. She has not undergone any laboratory tests for her ear condition. She reports no nasal or throat problems, including drainage. She has been prescribed amoxicillin, eardrops, and Flonase, the latter being used under the assumption that her symptoms may be related to a nasal condition.    She has a history of diabetes, which is well-managed, although she has not had recent lab work.    She reports no joint pain beyond her usual baseline. She sustained a fall in 11/2024, resulting in a torn PCL and a knee fracture.    Supplemental Information  She was on steroids in 01/2025 because she had a kidney stone.    MEDICATIONS  Current: amoxicillin, Flonase    Results  Testing  Hearing test shows some conductive change on the left side.    Past Medical History:   Diagnosis Date    Acanthosis nigricans  2018    Hirsutism 2018    Hypothyroidism due to Hashimoto's thyroiditis 2018       Past Surgical History:   Procedure Laterality Date     SECTION         Family History: Her family history includes Autism in her son; Diabetes in her mother; No Known Problems in her father.     Social History: She  reports that she has never smoked. She has never used smokeless tobacco. She reports that she does not drink alcohol and does not use drugs.    Home Medications:  OneTouch UltraSoft 2 Lancets, escitalopram, fluticasone, gabapentin, glucose blood, hydroCHLOROthiazide, iron polysaccharides, levothyroxine, losartan, metFORMIN, metoprolol succinate XL, norethindrone-ethinyl estradiol FE, omeprazole, potassium chloride, potassium chloride ER, sucralfate, tamsulosin, and vitamin D    Allergies:  She is allergic to erythromycin and septra [sulfamethoxazole-trimethoprim].       Vital Signs:   Temp:  [97.8 °F (36.6 °C)] 97.8 °F (36.6 °C)  Heart Rate:  [69] 69  BP: (93)/(68) 93/68  ENT Physical Exam  Constitutional  Appearance: patient appears well-developed, well-nourished and well-groomed,  Communication/Voice: communication appropriate for developmental age; vocal quality normal;  Head and Face  Appearance: head appears normal, face appears normal and face appears atraumatic;  Palpation: facial palpation normal;  Salivary: glands normal;  Ear  Hearing: intact;  Auricles: right auricle normal; left auricle normal;  External Mastoids: right external mastoid normal; left external mastoid normal;  Ear Canals: right ear canal normal; left ear canal normal;  Tympanic Membranes: left tympanic membrane abnormal; injected;       Physical Exam  Oral exam was performed.        Result Review       RESULTS REVIEW    I have reviewed the patients old records in the chart.   The following results/records were reviewed:   Procedure visit with Roz Michaud Au.D (2025) snhl bilaterally type A       Assessment &  Plan  Otalgia of left ear    Ear itch    Sensorineural hearing loss, bilateral       Assessment & Plan  1. Left ear pruritus.  Her hearing test results indicate a slight conductive alteration on the left side, suggesting an obstruction to sound transmission. Given her history of PCOS and diabetes, both of which are autoimmune conditions, it is prudent to rule out other potential causes. A prescription for home-made drops, consisting of equal parts distilled vinegar and distilled water, has been provided to alleviate the itching. She is advised to store the solution in the refrigerator and ensure it reaches room temperature before use. A few droppers have been provided for home use. Laboratory tests will be conducted to investigate any autoimmune causes.    2. Diabetes Mellitus.  She reports that her blood sugar levels are well-controlled. No recent labs have been done. Laboratory tests will be conducted to monitor her blood sugar levels and overall health.    3. History of PCL tear and knee fracture.  She fell in November and tore her PCL and fractured her knee. No new joint pain reported beyond the usual.    Follow-up  The patient will follow up in 4 weeks.     Orders Placed This Encounter   Procedures    Sedimentation Rate    C-reactive Protein    Anti-neutrophilic Cytoplasmic Antibody    Rheumatoid Factor, Quant         Call for ear problems, especially change of hearing, ear pain or dizziness.  Return in about 4 weeks (around 3/31/2025) for Follow up with SANDRA Pickering.        Electronically signed by SANDRA Pickering 03/03/25 8:59 AM CST.     Patient or patient representative verbalized consent for the use of Ambient Listening during the visit with  SANDRA Pickering for chart documentation. 3/3/2025  08:59 CST

## 2025-03-04 LAB
C-ANCA TITR SER IF: NORMAL TITER
P-ANCA ATYPICAL TITR SER IF: NORMAL TITER
P-ANCA TITR SER IF: NORMAL TITER

## 2025-03-31 ENCOUNTER — OFFICE VISIT (OUTPATIENT)
Dept: OTOLARYNGOLOGY | Facility: CLINIC | Age: 35
End: 2025-03-31
Payer: COMMERCIAL

## 2025-03-31 VITALS — WEIGHT: 293 LBS | HEIGHT: 65 IN | BODY MASS INDEX: 48.82 KG/M2

## 2025-03-31 DIAGNOSIS — H92.02 OTALGIA OF LEFT EAR: Primary | ICD-10-CM

## 2025-03-31 DIAGNOSIS — L29.9 EAR ITCH: ICD-10-CM

## 2025-03-31 PROCEDURE — 1159F MED LIST DOCD IN RCRD: CPT | Performed by: EMERGENCY MEDICINE

## 2025-03-31 PROCEDURE — 99213 OFFICE O/P EST LOW 20 MIN: CPT | Performed by: EMERGENCY MEDICINE

## 2025-03-31 PROCEDURE — 1160F RVW MEDS BY RX/DR IN RCRD: CPT | Performed by: EMERGENCY MEDICINE

## 2025-03-31 RX ORDER — MOMETASONE FUROATE 1 MG/G
1 CREAM TOPICAL DAILY
Qty: 7 G | Refills: 0 | Status: SHIPPED | OUTPATIENT
Start: 2025-03-31 | End: 2025-04-07

## 2025-03-31 NOTE — PROGRESS NOTES
SANDRA Pickering ENT [facility] EAR NOSE & THROAT  [address], [address]  [facility] [facility] [address]  Fax [phone]  Phone [phone]      Visit Type: FOLLOW UP   Chief Complaint   Patient presents with    Ear Problem     No change in ears since last visit            HPI      History of Present Illness  The patient is a 34-year-old female who presents for follow-up with ear complaints.    She reports persistent pruritus and a burning sensation in her ears, which she attributes to deep within the ear rather than the canal. She does not use Q-tips or any other objects in her ears. Additionally, she notes that the use of headphones exacerbates the itching and burning sensations.    SOCIAL HISTORY  She does not smoke but lives with someone who smokes.    Results  Laboratory Studies  Inflammation markers were elevated. Rheumatoid factor was normal. Wegener's test was normal.    Past Medical History:   Diagnosis Date    Acanthosis nigricans 2018    Hirsutism 2018    Hypothyroidism due to Hashimoto's thyroiditis 2018       Past Surgical History:   Procedure Laterality Date     SECTION      TOOTH EXTRACTION  2025    top teeth removed       Family History: Her family history includes Autism in her son; Diabetes in her mother; No Known Problems in her father.     Social History: She  reports that she has never smoked. She has never used smokeless tobacco. She reports that she does not drink alcohol and does not use drugs.    Home Medications:  OneTouch UltraSoft 2 Lancets, escitalopram, fluticasone, gabapentin, glucose blood, hydroCHLOROthiazide, iron polysaccharides, levothyroxine, losartan, metFORMIN, metoprolol succinate XL, mometasone, norethindrone-ethinyl estradiol FE, omeprazole, potassium chloride, potassium chloride ER, sucralfate, tamsulosin, and vitamin D    Allergies:  She is allergic to erythromycin and septra  [sulfamethoxazole-trimethoprim].       Vital Signs:      ENT Physical Exam  Ear  Hearing: intact;  Auricles: right auricle normal; left auricle normal;  External Mastoids: right external mastoid normal; left external mastoid normal;  Ear Canals: right ear canal normal; left ear canal tenderness (erythemous) observed;  Tympanic Membranes: right tympanic membrane normal; left tympanic membrane normal;       Physical Exam  The left eardrum appears improved.               Assessment & Plan  Otalgia of left ear    Ear itch       Assessment & Plan  1. Otalgia.  Her rheumatoid factor and Wegener's test results were within normal limits, but inflammation markers were elevated. The left tympanic membrane showed improvement upon examination. A trial of steroid cream to be applied daily for 7 days is recommended to alleviate the inflammation in the ear canal. If there is no improvement, a myringotomy trial will be considered, which involves making a small incision in the eardrum to see if it provides relief. If symptoms recur after healing, a tube may be considered.    Follow-up  The patient will follow up in 4 weeks.         New Medications Ordered This Visit   Medications    mometasone (ELOCON) 0.1 % cream     Sig: Apply 1 Application topically to the appropriate area as directed Daily for 7 days. Apply to affected ear daily     Dispense:  7 g     Refill:  0       Return in about 4 weeks (around 4/28/2025) for Follow up with SANDRA Pickering.        Electronically signed by SANDRA Pickering 03/31/25 9:32 AM CDT.     Patient or patient representative verbalized consent for the use of Ambient Listening during the visit with  SANDRA Pickering for chart documentation. 3/31/2025  09:32 CDT